# Patient Record
Sex: FEMALE | Race: WHITE | NOT HISPANIC OR LATINO | Employment: FULL TIME | URBAN - METROPOLITAN AREA
[De-identification: names, ages, dates, MRNs, and addresses within clinical notes are randomized per-mention and may not be internally consistent; named-entity substitution may affect disease eponyms.]

---

## 2019-03-29 ENCOUNTER — HOSPITAL ENCOUNTER (OUTPATIENT)
Dept: OTHER | Facility: HOSPITAL | Age: 24
Setting detail: SPECIMEN
Discharge: HOME | End: 2019-03-29
Attending: NURSE PRACTITIONER | Admitting: NURSE PRACTITIONER

## 2019-03-29 LAB
ALBUMIN SERPL-MCNC: 3.7 G/DL (ref 3.5–4.8)
ALBUMIN/GLOB SERPL: 1 {RATIO} (ref 1–1.7)
ALP SERPL-CCNC: 49 IU/L (ref 32–91)
ALT SERPL-CCNC: 18 IU/L (ref 14–54)
ANION GAP SERPL CALC-SCNC: 15 MMOL/L (ref 10–20)
AST SERPL-CCNC: 20 IU/L (ref 15–41)
BASOPHILS # BLD AUTO: 0 10*3/UL (ref 0–0.2)
BASOPHILS NFR BLD AUTO: 1 % (ref 0–2)
BILIRUB SERPL-MCNC: 0.5 MG/DL (ref 0.3–1.2)
BUN SERPL-MCNC: 9 MG/DL (ref 8–20)
BUN/CREAT SERPL: 12.9 (ref 5.4–26.2)
CALCIUM SERPL-MCNC: 9.2 MG/DL (ref 8.9–10.3)
CHLORIDE SERPL-SCNC: 103 MMOL/L (ref 101–111)
CHOLEST SERPL-MCNC: 188 MG/DL
CHOLEST/HDLC SERPL: 3.7 {RATIO}
CONV CO2: 23 MMOL/L (ref 22–32)
CONV LDL CHOLESTEROL DIRECT: 125 MG/DL (ref 0–100)
CONV TOTAL PROTEIN: 7.3 G/DL (ref 6.1–7.9)
CREAT UR-MCNC: 0.7 MG/DL (ref 0.4–1)
DIFFERENTIAL METHOD BLD: (no result)
EOSINOPHIL # BLD AUTO: 0.6 10*3/UL (ref 0–0.3)
EOSINOPHIL # BLD AUTO: 9 % (ref 0–3)
ERYTHROCYTE [DISTWIDTH] IN BLOOD BY AUTOMATED COUNT: 12.6 % (ref 11.5–14.5)
GLOBULIN UR ELPH-MCNC: 3.6 G/DL (ref 2.5–3.8)
GLUCOSE SERPL-MCNC: 78 MG/DL (ref 65–99)
HCT VFR BLD AUTO: 40.6 % (ref 35–49)
HDLC SERPL-MCNC: 51 MG/DL
HGB BLD-MCNC: 14 G/DL (ref 12–15)
LDLC/HDLC SERPL: 2.4 {RATIO}
LIPID INTERPRETATION: ABNORMAL
LYMPHOCYTES # BLD AUTO: 1.8 10*3/UL (ref 0.8–4.8)
LYMPHOCYTES NFR BLD AUTO: 25 % (ref 18–42)
MCH RBC QN AUTO: 31 PG (ref 26–32)
MCHC RBC AUTO-ENTMCNC: 34.5 G/DL (ref 32–36)
MCV RBC AUTO: 89.9 FL (ref 80–94)
MONOCYTES # BLD AUTO: 0.6 10*3/UL (ref 0.1–1.3)
MONOCYTES NFR BLD AUTO: 9 % (ref 2–11)
NEUTROPHILS # BLD AUTO: 4 10*3/UL (ref 2.3–8.6)
NEUTROPHILS NFR BLD AUTO: 56 % (ref 50–75)
NRBC BLD AUTO-RTO: 0 /100{WBCS}
NRBC/RBC NFR BLD MANUAL: 0 10*3/UL
PLATELET # BLD AUTO: 285 10*3/UL (ref 150–450)
PMV BLD AUTO: 8.9 FL (ref 7.4–10.4)
POTASSIUM SERPL-SCNC: 4 MMOL/L (ref 3.6–5.1)
RBC # BLD AUTO: 4.52 10*6/UL (ref 4–5.4)
SODIUM SERPL-SCNC: 137 MMOL/L (ref 136–144)
TRIGL SERPL-MCNC: 122 MG/DL
VLDLC SERPL CALC-MCNC: 11.9 MG/DL
WBC # BLD AUTO: 7.1 10*3/UL (ref 4.5–11.5)

## 2020-08-26 ENCOUNTER — OFFICE VISIT (OUTPATIENT)
Dept: FAMILY MEDICINE CLINIC | Facility: CLINIC | Age: 25
End: 2020-08-26

## 2020-08-26 ENCOUNTER — LAB (OUTPATIENT)
Dept: FAMILY MEDICINE CLINIC | Facility: CLINIC | Age: 25
End: 2020-08-26

## 2020-08-26 VITALS
DIASTOLIC BLOOD PRESSURE: 69 MMHG | OXYGEN SATURATION: 98 % | HEART RATE: 83 BPM | WEIGHT: 129.8 LBS | TEMPERATURE: 97.8 F | BODY MASS INDEX: 21.63 KG/M2 | SYSTOLIC BLOOD PRESSURE: 105 MMHG | HEIGHT: 65 IN

## 2020-08-26 DIAGNOSIS — Z11.59 NEED FOR HEPATITIS C SCREENING TEST: ICD-10-CM

## 2020-08-26 DIAGNOSIS — Z91.09 ENVIRONMENTAL ALLERGIES: ICD-10-CM

## 2020-08-26 DIAGNOSIS — E78.2 MIXED HYPERLIPIDEMIA: Primary | ICD-10-CM

## 2020-08-26 DIAGNOSIS — E78.2 MIXED HYPERLIPIDEMIA: ICD-10-CM

## 2020-08-26 DIAGNOSIS — Z23 NEED FOR VACCINATION: ICD-10-CM

## 2020-08-26 LAB
ALBUMIN SERPL-MCNC: 4.6 G/DL (ref 3.5–5.2)
ALBUMIN/GLOB SERPL: 1.7 G/DL
ALP SERPL-CCNC: 48 U/L (ref 39–117)
ALT SERPL W P-5'-P-CCNC: 35 U/L (ref 1–33)
ANION GAP SERPL CALCULATED.3IONS-SCNC: 6.7 MMOL/L (ref 5–15)
AST SERPL-CCNC: 28 U/L (ref 1–32)
BILIRUB SERPL-MCNC: 1.1 MG/DL (ref 0–1.2)
BUN SERPL-MCNC: 8 MG/DL (ref 6–20)
BUN/CREAT SERPL: 10.7 (ref 7–25)
CALCIUM SPEC-SCNC: 9.6 MG/DL (ref 8.6–10.5)
CHLORIDE SERPL-SCNC: 106 MMOL/L (ref 98–107)
CHOLEST SERPL-MCNC: 164 MG/DL (ref 0–200)
CO2 SERPL-SCNC: 26.3 MMOL/L (ref 22–29)
CREAT SERPL-MCNC: 0.75 MG/DL (ref 0.57–1)
GFR SERPL CREATININE-BSD FRML MDRD: 93 ML/MIN/1.73
GLOBULIN UR ELPH-MCNC: 2.7 GM/DL
GLUCOSE SERPL-MCNC: 83 MG/DL (ref 65–99)
HCV AB SER DONR QL: NORMAL
HDLC SERPL-MCNC: 43 MG/DL (ref 40–60)
LDLC SERPL CALC-MCNC: 111 MG/DL (ref 0–100)
LDLC/HDLC SERPL: 2.58 {RATIO}
POTASSIUM SERPL-SCNC: 4.1 MMOL/L (ref 3.5–5.2)
PROT SERPL-MCNC: 7.3 G/DL (ref 6–8.5)
SODIUM SERPL-SCNC: 139 MMOL/L (ref 136–145)
TRIGL SERPL-MCNC: 50 MG/DL (ref 0–150)
VLDLC SERPL-MCNC: 10 MG/DL (ref 5–40)

## 2020-08-26 PROCEDURE — 86803 HEPATITIS C AB TEST: CPT | Performed by: FAMILY MEDICINE

## 2020-08-26 PROCEDURE — 90715 TDAP VACCINE 7 YRS/> IM: CPT | Performed by: FAMILY MEDICINE

## 2020-08-26 PROCEDURE — 80053 COMPREHEN METABOLIC PANEL: CPT | Performed by: FAMILY MEDICINE

## 2020-08-26 PROCEDURE — 36415 COLL VENOUS BLD VENIPUNCTURE: CPT | Performed by: FAMILY MEDICINE

## 2020-08-26 PROCEDURE — 90471 IMMUNIZATION ADMIN: CPT | Performed by: FAMILY MEDICINE

## 2020-08-26 PROCEDURE — 80061 LIPID PANEL: CPT | Performed by: FAMILY MEDICINE

## 2020-08-26 PROCEDURE — 99213 OFFICE O/P EST LOW 20 MIN: CPT | Performed by: FAMILY MEDICINE

## 2020-08-26 RX ORDER — ELECTROLYTES/DEXTROSE
SOLUTION, ORAL ORAL
COMMUNITY
Start: 2018-03-26 | End: 2021-08-27

## 2020-08-26 RX ORDER — CETIRIZINE HYDROCHLORIDE 10 MG/1
10 TABLET ORAL DAILY
COMMUNITY
End: 2021-08-27

## 2020-08-26 RX ORDER — IBUPROFEN 200 MG
200 TABLET ORAL AS NEEDED
COMMUNITY
End: 2021-08-27

## 2020-08-26 NOTE — PROGRESS NOTES
Subjective   Salma Terrell is a 25 y.o. female.     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  History reviewed. No pertinent past medical history.  Past Surgical History:   Procedure Laterality Date   • APPENDECTOMY      2007   • GUM SURGERY     • WISDOM TOOTH EXTRACTION       Family History   Problem Relation Age of Onset   • Hyperlipidemia Father    • Hypertension Father    • Breast cancer Paternal Grandmother    • Hyperlipidemia Paternal Grandmother    • Hypertension Paternal Grandmother      Social History     Socioeconomic History   • Marital status:      Spouse name: Not on file   • Number of children: Not on file   • Years of education: Not on file   • Highest education level: Not on file   Tobacco Use   • Smoking status: Never Smoker   • Smokeless tobacco: Never Used   Substance and Sexual Activity   • Alcohol use: Not Currently   • Drug use: Never   • Sexual activity: Yes         Current Outpatient Medications:   •  cetirizine (zyrTEC) 10 MG tablet, Take 10 mg by mouth Daily., Disp: , Rfl:   •  ibuprofen (ADVIL,MOTRIN) 200 MG tablet, Take 200 mg by mouth As Needed for Mild Pain ., Disp: , Rfl:   •  Multiple Vitamins-Minerals (MULTIVITAMIN ADULT) tablet, MULTIVITAMIN ADULT TABS, Disp: , Rfl:     Review of Systems   Constitutional: Negative for diaphoresis, fatigue, fever, unexpected weight gain and unexpected weight loss.   HENT: Positive for rhinorrhea and sneezing.    Respiratory: Negative for cough, chest tightness and shortness of breath.    Cardiovascular: Negative for chest pain, palpitations and  "leg swelling.   Allergic/Immunologic: Positive for environmental allergies.   Neurological: Negative for dizziness, syncope and headache.     /69 (BP Location: Left arm, Patient Position: Sitting, Cuff Size: Adult)   Pulse 83   Temp 97.8 °F (36.6 °C) (Temporal)   Ht 165.7 cm (65.25\")   Wt 58.9 kg (129 lb 12.8 oz)   SpO2 98%   Breastfeeding No   BMI 21.43 kg/m²       Objective   Physical Exam   Constitutional: She appears well-developed and well-nourished. No distress.   HENT:   Head: Normocephalic and atraumatic.   Neck: Neck supple.   Cardiovascular: Normal rate, regular rhythm, normal heart sounds and intact distal pulses. Exam reveals no gallop and no friction rub.   No murmur heard.  Pulmonary/Chest: Effort normal and breath sounds normal. No respiratory distress. She has no wheezes. She has no rales.   Musculoskeletal: She exhibits no edema.   Lymphadenopathy:     She has no cervical adenopathy.   Neurological: She is alert.   Skin: Skin is warm and dry.   Psychiatric: She has a normal mood and affect.   Nursing note and vitals reviewed.        Assessment/Plan   Problems Addressed this Visit        Cardiovascular and Mediastinum    Mixed hyperlipidemia - Primary    Relevant Orders    Comprehensive Metabolic Panel    Lipid Panel       Other    Environmental allergies      Other Visit Diagnoses     Need for hepatitis C screening test        Relevant Orders    Hepatitis C Antibody    Need for vaccination        Relevant Orders    Tdap Vaccine Greater Than or Equal To 8yo IM (Completed)          Labs ordered  Encouraged her to get her flu shot this Fall  She will continue current meds  tdap updated       "

## 2020-09-03 ENCOUNTER — E-VISIT (OUTPATIENT)
Dept: FAMILY MEDICINE CLINIC | Facility: TELEHEALTH | Age: 25
End: 2020-09-03

## 2020-09-03 DIAGNOSIS — J06.9 UPPER RESPIRATORY TRACT INFECTION, UNSPECIFIED TYPE: Primary | ICD-10-CM

## 2020-09-03 PROCEDURE — U0003 INFECTIOUS AGENT DETECTION BY NUCLEIC ACID (DNA OR RNA); SEVERE ACUTE RESPIRATORY SYNDROME CORONAVIRUS 2 (SARS-COV-2) (CORONAVIRUS DISEASE [COVID-19]), AMPLIFIED PROBE TECHNIQUE, MAKING USE OF HIGH THROUGHPUT TECHNOLOGIES AS DESCRIBED BY CMS-2020-01-R: HCPCS | Performed by: NURSE PRACTITIONER

## 2020-09-03 PROCEDURE — 99422 OL DIG E/M SVC 11-20 MIN: CPT | Performed by: NURSE PRACTITIONER

## 2020-09-03 RX ORDER — BROMPHENIRAMINE MALEATE, PSEUDOEPHEDRINE HYDROCHLORIDE, AND DEXTROMETHORPHAN HYDROBROMIDE 2; 30; 10 MG/5ML; MG/5ML; MG/5ML
10 SYRUP ORAL 4 TIMES DAILY PRN
Qty: 280 ML | Refills: 0 | Status: SHIPPED | OUTPATIENT
Start: 2020-09-03 | End: 2020-11-17

## 2020-09-03 RX ORDER — BENZONATATE 200 MG/1
200 CAPSULE ORAL 3 TIMES DAILY PRN
Qty: 28 CAPSULE | Refills: 0 | Status: SHIPPED | OUTPATIENT
Start: 2020-09-03 | End: 2020-11-17

## 2020-09-03 NOTE — PROGRESS NOTES
"Salma Terrell    1995  [id number]    I have reviewed the e-Visit questionnaire and patient's answers, my assessment and plan are as follows:    HPI- Pt reports cough, fever/chills, nasal congestion, sore throat, HA, sweating HS, loss of appetite x 2 days. Cough is infrequent with clear phlegm. She reports being able to taste bacteria in the back of her throat and occasionally coughs up \"gunk\". Her tonsils are a little swollen. Denies red throat, white spots on tonsils. 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     Review of Systems - General ROS: positive for  - chills, fever and night sweats  ENT ROS: positive for - headaches, nasal congestion and sore throat  Respiratory ROS: positive for - cough and sputum changes      Diagnoses and all orders for this visit:    Upper respiratory tract infection, unspecified type  -     brompheniramine-pseudoephedrine-DM 30-2-10 MG/5ML syrup; Take 10 mL by mouth 4 (Four) Times a Day As Needed for Congestion or Cough.  -     benzonatate (TESSALON) 200 MG capsule; Take 1 capsule by mouth 3 (Three) Times a Day As Needed for Cough.  -     COVID-19,[facility] ROUTINE, NP/OP SWAB IN TRANSPORT MEDIA OR ESWAB 72 HR TAT - Swab, Nasopharynx; Future  -     QUESTIONNAIRE SERIES    Go to nearest [facility] for COVID-19 test. Call before you arrive and let them know you have an order. We will call you with the results.   Take medicine as prescribed, continue to treat your symptoms as you would with any cold or viral illness.   SELF QUARANTINE until you meet the following criteria:   -It has been at least 10 days from the onset of your symptoms,   -A minimum of 24 hours fever free without fever reducing medicine   -AND improved symptoms of cough, shortness of air, difficulty breathing, sore throat, muscle aches or body aches, chills, runny nose or congestion, loss of sense of taste or smell, nausea or vomiting, headache, diarrhea.     **Wear a cloth or surgical mask for " 14 days or until symptoms have resolved**    If symptoms worsen or do not improve follow up with your PCP or visit your nearest Urgent Care Center or ER.    Any medications prescribed have been sent electronically to   [facility]/pharmacy #[id number] - [place], [place] - [address] - [phone]  - [phone]   [address]  [place] [address]  Phone: [phone] Fax: [phone]    I spent 15 minutes reviewing this chart.     Mary Bobo, APRN  09/03/20  10:25 AM

## 2020-09-03 NOTE — PATIENT INSTRUCTIONS
Go to nearest [facility] for COVID-19 test. Call before you arrive and let them know you have an order. We will call you with the results.   Take medicine as prescribed, continue to treat your symptoms as you would with any cold or viral illness.   SELF QUARANTINE until you meet the following criteria:   -It has been at least 10 days from the onset of your symptoms,   -A minimum of 24 hours fever free without fever reducing medicine   -AND improved symptoms of cough, shortness of air, difficulty breathing, sore throat, muscle aches or body aches, chills, runny nose or congestion, loss of sense of taste or smell, nausea or vomiting, headache, diarrhea.     **Wear a cloth or surgical mask for 14 days or until symptoms have resolved**    If symptoms worsen or do not improve follow up with your PCP or visit your nearest Urgent Care Center or ER.      How to Quarantine at Home  Information for Patients and Families    These instructions are for people with confirmed or suspected COVID-19 who do not need to be hospitalized and those with confirmed COVID-19 who were hospitalized and discharged to care for themselves at home.    If you were tested through the Health Department  The Health Department will monitor your wellbeing.  If it is determined that you do not need to be hospitalized and can be isolated at home, you will be monitored by staff from your local or state health department.     If you were tested through a Commercial [facility]  You will need to monitor yourself and report changes in your symptoms to your doctor.  See the section below called Monitor Your Symptoms.    Follow these steps until a healthcare provider or local or state health department says you can return to your normal activities.    Stay home except to get medical care  • Restrict activities outside your home, except for getting medical care.   • Do not go to work, school, or public areas.   • Avoid using public transportation,  ride-sharing, or taxis.    Separate yourself from other people and animals in your home  People  As much as possible, you should stay in a specific room and away from other people in your home. Also, you should use a separate bathroom, if available.    Animals  You should restrict contact with pets and other animals while you are sick with COVID-19, just like you would around other people. When possible, have another member of your household care for your animals while you are sick. If you are sick with COVID-19, avoid contact with your pet, including petting, snuggling, being kissed or licked, and sharing food. If you must care for your pet or be around animals while you are sick, wash your hands before and after you interact with pets and wear a facemask. See COVID-19 and Animals for more information.    Call ahead before visiting your doctor  If you have a medical appointment, call the healthcare provider and tell them that you have or may have COVID-19. This information will help the healthcare provider’s office take steps to keep other people from getting infected or exposed.    Wear a facemask  You should wear a facemask when you are around other people (e.g., sharing a room or vehicle) or pets and before you enter a healthcare provider’s office.     If you are not able to wear a facemask (for example, because it causes trouble breathing), then people who live with you should not stay in the same room with you, or they should wear a facemask if they enter your room.    Cover your coughs and sneezes  • Cover your mouth and nose with a tissue when you cough or sneeze.   • Throw used tissues in a lined trash can.   • Immediately wash your hands with soap and water for at least 20 seconds or, if soap and water are not available, clean your hands with an alcohol-based hand  that contains at least 60% alcohol.    Clean your hands often  • Wash your hands often with soap and water for at least 20 seconds,  especially after blowing your nose, coughing, or sneezing; going to the bathroom; and before eating or preparing food.     • If soap and water are not readily available, use an alcohol-based hand  with at least 60% alcohol, covering all surfaces of your hands and rubbing them together until they feel dry.    • Soap and water are the best option if hands are visibly dirty. Avoid touching your eyes, nose, and mouth with unwashed hands.    Avoid sharing personal household items  • You should not share dishes, drinking glasses, cups, eating utensils, towels, or bedding with other people or pets in your home.   • After using these items, they should be washed thoroughly with soap and water.    Clean all “high-touch” surfaces everyday  • High touch surfaces include counters, tabletops, doorknobs, bathroom fixtures, toilets, phones, keyboards, tablets, and bedside tables.   • Also, clean any surfaces that may have blood, stool, or body fluids on them.   • Use a household cleaning spray or wipe, according to the label instructions. Labels contain instructions for safe and effective use of the cleaning product, including precautions you should take when applying the product, such as wearing gloves and making sure you have good ventilation during use of the product.    Monitor your symptoms  • Seek prompt medical attention if your illness is worsening (e.g., difficulty breathing).   • Before seeking care, call your healthcare provider and tell them that you have, or are being evaluated for, COVID-19.   • Put on a facemask before you enter the facility.     • These steps will help the healthcare provider’s office to keep other people in the office or waiting room from getting infected or exposed.   • Persons who are placed under active monitoring or facilitated self-monitoring should follow instructions provided by their local health department or occupational health professionals, as appropriate.  • If you have a  medical emergency and need to call 911, notify the dispatch personnel that you have, or are being evaluated for COVID-19. If possible, put on a facemask before emergency medical services arrive.    Discontinuing home isolation  Patients with confirmed COVID-19 should remain under home isolation precautions until the risk of secondary transmission to others is thought to be low. The decision to discontinue home isolation precautions should be made on a case-by-case basis, in consultation with healthcare providers and state and local health departments.    The below content are for household members, intimate partners, and caregivers of a patient with symptomatic laboratory-confirmed COVID-19 or a patient under investigation:    Household members, intimate partners, and caregivers may have close contact with a person with symptomatic, laboratory-confirmed COVID-19 or a person under investigation.     Close contacts should monitor their health; they should call their healthcare provider right away if they develop symptoms suggestive of COVID-19 (e.g., fever, cough, shortness of breath)     Close contacts should also follow these recommendations:  • Make sure that you understand and can help the patient follow their healthcare provider’s instructions for medication(s) and care. You should help the patient with basic needs in the home and provide support for getting groceries, prescriptions, and other personal needs.  • Monitor the patient’s symptoms. If the patient is getting sicker, call his or her healthcare provider and tell them that the patient has laboratory-confirmed COVID-19. This will help the healthcare provider’s office take steps to keep other people in the office or waiting room from getting infected. Ask the healthcare provider to call the local or state health department for additional guidance. If the patient has a medical emergency and you need to call 911, notify the dispatch personnel that the patient  has, or is being evaluated for COVID-19.  • Household members should stay in another room or be  from the patient as much as possible. Household members should use a separate bedroom and bathroom, if available.  • Prohibit visitors who do not have an essential need to be in the home.  • Household members should care for any pets in the home. Do not handle pets or other animals while sick.  For more information, see COVID-19 and Animals.  • Make sure that shared spaces in the home have good air flow, such as by an air conditioner or an opened window, weather permitting.  • Perform hand hygiene frequently. Wash your hands often with soap and water for at least 20 seconds or use an alcohol-based hand  that contains 60 to 95% alcohol, covering all surfaces of your hands and rubbing them together until they feel dry. Soap and water should be used preferentially if hands are visibly dirty.  • Avoid touching your eyes, nose, and mouth with unwashed hands.  • The patient should wear a facemask when you are around other people. If the patient is not able to wear a facemask (for example, because it causes trouble breathing), you, as the caregiver, should wear a mask when you are in the same room as the patient.  • Wear a disposable facemask and gloves when you touch or have contact with the patient’s blood, stool, or body fluids, such as saliva, sputum, nasal mucus, vomit, or urine.   o Throw out disposable facemasks and gloves after using them. Do not reuse.  o When removing personal protective equipment, first remove and dispose of gloves. Then, immediately clean your hands with soap and water or alcohol-based hand . Next, remove and dispose of facemask, and immediately clean your hands again with soap and water or alcohol-based hand .  • Avoid sharing household items with the patient. You should not share dishes, drinking glasses, cups, eating utensils, towels, bedding, or other items.  After the patient uses these items, you should wash them thoroughly (see below “Wash laundry thoroughly”).  • Clean all “high-touch” surfaces, such as counters, tabletops, doorknobs, bathroom fixtures, toilets, phones, keyboards, tablets, and bedside tables, every day. Also, clean any surfaces that may have blood, stool, or body fluids on them.   o Use a household cleaning spray or wipe, according to the label instructions. Labels contain instructions for safe and effective use of the cleaning product including precautions you should take when applying the product, such as wearing gloves and making sure you have good ventilation during use of the product.  • Wash laundry thoroughly.   o Immediately remove and wash clothes or bedding that have blood, stool, or body fluids on them.  o Wear disposable gloves while handling soiled items and keep soiled items away from your body. Clean your hands (with soap and water or an alcohol-based hand ) immediately after removing your gloves.  o Read and follow directions on labels of laundry or clothing items and detergent. In general, using a normal laundry detergent according to washing machine instructions and dry thoroughly using the warmest temperatures recommended on the clothing label.  • Place all used disposable gloves, facemasks, and other contaminated items in a lined container before disposing of them with other household waste. Clean your hands (with soap and water or an alcohol-based hand ) immediately after handling these items. Soap and water should be used preferentially if hands are visibly dirty.  • Discuss any additional questions with your state or local health department or healthcare provider.    Adapted from information provided by the Centers for Disease Control and Prevention.  For more information, visit https://www.cdc.gov/coronavirus/2019-ncov/hcp/guidance-prevent-spread.htmlCOVID-19  COVID-19 is a respiratory infection that is  caused by a virus called severe acute respiratory syndrome coronavirus 2 (SARS-CoV-2). The disease is also known as coronavirus disease or novel coronavirus. In some people, the virus may not cause any symptoms. In others, it may cause a serious infection. The infection can get worse quickly and can lead to complications, such as:  · Pneumonia, or infection of the lungs.  · Acute respiratory distress syndrome or ARDS. This is fluid build-up in the lungs.  · Acute respiratory failure. This is a condition in which there is not enough oxygen passing from the lungs to the body.  · Sepsis or septic shock. This is a serious bodily reaction to an infection.  · Blood clotting problems.  · Secondary infections due to bacteria or fungus.  The virus that causes COVID-19 is contagious. This means that it can spread from person to person through droplets from coughs and sneezes (respiratory secretions).  What are the causes?  This illness is caused by a virus. You may catch the virus by:  · Breathing in droplets from an infected person's cough or sneeze.  · Touching something, like a table or a doorknob, that was exposed to the virus (contaminated) and then touching your mouth, nose, or eyes.  What increases the risk?  Risk for infection  You are more likely to be infected with this virus if you:  · Live in or travel to an area with a COVID-19 outbreak.  · Come in contact with a sick person who recently traveled to an area with a COVID-19 outbreak.  · Provide care for or live with a person who is infected with COVID-19.  Risk for serious illness  You are more likely to become seriously ill from the virus if you:  · Are 65 years of age or older.  · Have a long-term disease that lowers your body's ability to fight infection (immunocompromised).  · Live in a nursing home or long-term care facility.  · Have a long-term (chronic) disease such as:  ? Chronic lung disease, including chronic obstructive pulmonary disease or  asthma  ? Heart disease.  ? Diabetes.  ? Chronic kidney disease.  ? Liver disease.  · Are obese.  What are the signs or symptoms?  Symptoms of this condition can range from mild to severe. Symptoms may appear any time from 2 to 14 days after being exposed to the virus. They include:  · A fever.  · A cough.  · Difficulty breathing.  · Chills.  · Muscle pains.  · A sore throat.  · Loss of taste or smell.  Some people may also have stomach problems, such as nausea, vomiting, or diarrhea.  Other people may not have any symptoms of COVID-19.  How is this diagnosed?  This condition may be diagnosed based on:  · Your signs and symptoms, especially if:  ? You live in an area with a COVID-19 outbreak.  ? You recently traveled to or from an area where the virus is common.  ? You provide care for or live with a person who was diagnosed with COVID-19.  · A physical exam.  · Lab tests, which may include:  ? A nasal swab to take a sample of fluid from your nose.  ? A throat swab to take a sample of fluid from your throat.  ? A sample of mucus from your lungs (sputum).  ? Blood tests.  · Imaging tests, which may include, X-rays, CT scan, or ultrasound.  How is this treated?  At present, there is no medicine to treat COVID-19. Medicines that treat other diseases are being used on a trial basis to see if they are effective against COVID-19.  Your health care provider will talk with you about ways to treat your symptoms. For most people, the infection is mild and can be managed at home with rest, fluids, and over-the-counter medicines.  Treatment for a serious infection usually takes places in a hospital intensive care unit (ICU). It may include one or more of the following treatments. These treatments are given until your symptoms improve.  · Receiving fluids and medicines through an IV.  · Supplemental oxygen. Extra oxygen is given through a tube in the nose, a face mask, or a gomez.  · Positioning you to lie on your stomach (prone  position). This makes it easier for oxygen to get into the lungs.  · Continuous positive airway pressure (CPAP) or bi-level positive airway pressure (BPAP) machine. This treatment uses mild air pressure to keep the airways open. A tube that is connected to a motor delivers oxygen to the body.  · Ventilator. This treatment moves air into and out of the lungs by using a tube that is placed in your windpipe.  · Tracheostomy. This is a procedure to create a hole in the neck so that a breathing tube can be inserted.  · Extracorporeal membrane oxygenation (ECMO). This procedure gives the lungs a chance to recover by taking over the functions of the heart and lungs. It supplies oxygen to the body and removes carbon dioxide.  Follow these instructions at home:  Lifestyle  · If you are sick, stay home except to get medical care. Your health care provider will tell you how long to stay home. Call your health care provider before you go for medical care.  · Rest at home as told by your health care provider.  · Do not use any products that contain nicotine or tobacco, such as cigarettes, e-cigarettes, and chewing tobacco. If you need help quitting, ask your health care provider.  · Return to your normal activities as told by your health care provider. Ask your health care provider what activities are safe for you.  General instructions  · Take over-the-counter and prescription medicines only as told by your health care provider.  · Drink enough fluid to keep your urine pale yellow.  · Keep all follow-up visits as told by your health care provider. This is important.  How is this prevented?    There is no vaccine to help prevent COVID-19 infection. However, there are steps you can take to protect yourself and others from this virus.  To protect yourself:   · Do not travel to areas where COVID-19 is a risk. The areas where COVID-19 is reported change often. To identify high-risk areas and travel restrictions, check the CDC travel  website: wwwnc.cdc.gov/travel/notices  · If you live in, or must travel to, an area where COVID-19 is a risk, take precautions to avoid infection.  ? Stay away from people who are sick.  ? Wash your hands often with soap and water for 20 seconds. If soap and water are not available, use an alcohol-based hand .  ? Avoid touching your mouth, face, eyes, or nose.  ? Avoid going out in public, follow guidance from your state and local health authorities.  ? If you must go out in public, wear a cloth face covering or face mask.  ? Disinfect objects and surfaces that are frequently touched every day. This may include:  § Counters and tables.  § Doorknobs and light switches.  § Sinks and faucets.  § Electronics, such as phones, remote controls, keyboards, computers, and tablets.  To protect others:  If you have symptoms of COVID-19, take steps to prevent the virus from spreading to others.  · If you think you have a COVID-19 infection, contact your health care provider right away. Tell your health care team that you think you may have a COVID-19 infection.  · Stay home. Leave your house only to seek medical care. Do not use public transport.  · Do not travel while you are sick.  · Wash your hands often with soap and water for 20 seconds. If soap and water are not available, use alcohol-based hand .  · Stay away from other members of your household. Let healthy household members care for children and pets, if possible. If you have to care for children or pets, wash your hands often and wear a mask. If possible, stay in your own room, separate from others. Use a different bathroom.  · Make sure that all people in your household wash their hands well and often.  · Cough or sneeze into a tissue or your sleeve or elbow. Do not cough or sneeze into your hand or into the air.  · Wear a cloth face covering or face mask.  Where to find more information  · Centers for Disease Control and Prevention:  www.cdc.gov/coronavirus/2019-ncov/index.html  · World Health Organization: www.who.int/health-topics/coronavirus  Contact a health care provider if:  · You live in or have traveled to an area where COVID-19 is a risk and you have symptoms of the infection.  · You have had contact with someone who has COVID-19 and you have symptoms of the infection.  Get help right away if:  · You have trouble breathing.  · You have pain or pressure in your chest.  · You have confusion.  · You have bluish lips and fingernails.  · You have difficulty waking from sleep.  · You have symptoms that get worse.  These symptoms may represent a serious problem that is an emergency. Do not wait to see if the symptoms will go away. Get medical help right away. Call your local emergency services (911 in the [place] Do not drive yourself to the hospital. Let the emergency medical personnel know if you think you have COVID-19.  Summary  · COVID-19 is a respiratory infection that is caused by a virus. It is also known as coronavirus disease or novel coronavirus. It can cause serious infections, such as pneumonia, acute respiratory distress syndrome, acute respiratory failure, or sepsis.  · The virus that causes COVID-19 is contagious. This means that it can spread from person to person through droplets from coughs and sneezes.  · You are more likely to develop a serious illness if you are 65 years of age or older, have a weak immunity, live in a nursing home, or have chronic disease.  · There is no medicine to treat COVID-19. Your health care provider will talk with you about ways to treat your symptoms.  · Take steps to protect yourself and others from infection. Wash your hands often and disinfect objects and surfaces that are frequently touched every day. Stay away from people who are sick and wear a mask if you are sick.  This information is not intended to replace advice given to you by your health care provider. Make sure you discuss any  "questions you have with your health care provider.  Document Released: 01/23/2020 Document Revised: 05/14/2020 Document Reviewed: 01/23/2020  [employer]vier Patient Education © 2020 [employer]  Upper Respiratory Infection, Adult  An upper respiratory infection (URI) affects the nose, throat, and upper air passages. URIs are caused by germs (viruses). The most common type of URI is often called \"the common cold.\"  Medicines cannot cure URIs, but you can do things at home to relieve your symptoms. URIs usually get better within 7-10 days.  Follow these instructions at home:  Activity  · Rest as needed.  · If you have a fever, stay home from work or school until your fever is gone, or until your doctor says you may return to work or school.  ? You should stay home until you cannot spread the infection anymore (you are not contagious).  ? Your doctor may have you wear a face mask so you have less risk of spreading the infection.  Relieving symptoms  · Gargle with a salt-water mixture 3-4 times a day or as needed. To make a salt-water mixture, completely dissolve ½-1 tsp of salt in 1 cup of warm water.  · Use a cool-mist humidifier to add moisture to the air. This can help you breathe more easily.  Eating and drinking    · Drink enough fluid to keep your pee (urine) pale yellow.  · Eat soups and other clear broths.  General instructions    · Take over-the-counter and prescription medicines only as told by your doctor. These include cold medicines, fever reducers, and cough suppressants.  · Do not use any products that contain nicotine or tobacco. These include cigarettes and e-cigarettes. If you need help quitting, ask your doctor.  · Avoid being where people are smoking (avoid secondhand smoke).  · Make sure you get regular shots and get the flu shot every year.  · Keep all follow-up visits as told by your doctor. This is important.  How to avoid spreading infection to others    · Wash your hands often with soap and water. " "If you do not have soap and water, use hand .  · Avoid touching your mouth, face, eyes, or nose.  · Cough or sneeze into a tissue or your sleeve or elbow. Do not cough or sneeze into your hand or into the air.  Contact a doctor if:  · You are getting worse, not better.  · You have any of these:  ? A fever.  ? Chills.  ? Brown or red mucus in your nose.  ? Yellow or brown fluid (discharge)coming from your nose.  ? Pain in your face, especially when you bend forward.  ? Swollen neck glands.  ? Pain with swallowing.  ? White areas in the back of your throat.  Get help right away if:  · You have shortness of breath that gets worse.  · You have very bad or constant:  ? Headache.  ? Ear pain.  ? Pain in your forehead, behind your eyes, and over your cheekbones (sinus pain).  ? Chest pain.  · You have long-lasting (chronic) lung disease along with any of these:  ? Wheezing.  ? Long-lasting cough.  ? Coughing up blood.  ? A change in your usual mucus.  · You have a stiff neck.  · You have changes in your:  ? Vision.  ? Hearing.  ? Thinking.  ? Mood.  Summary  · An upper respiratory infection (URI) is caused by a germ called a virus. The most common type of URI is often called \"the common cold.\"  · URIs usually get better within 7-10 days.  · Take over-the-counter and prescription medicines only as told by your doctor.  This information is not intended to replace advice given to you by your health care provider. Make sure you discuss any questions you have with your health care provider.  Document Released: 06/05/2009 Document Revised: 12/26/2019 Document Reviewed: 08/10/2018  Elsevier Patient Education © 2020 [employer]    "

## 2020-11-17 ENCOUNTER — E-VISIT (OUTPATIENT)
Dept: FAMILY MEDICINE CLINIC | Facility: TELEHEALTH | Age: 25
End: 2020-11-17

## 2020-11-17 DIAGNOSIS — J06.9 UPPER RESPIRATORY TRACT INFECTION, UNSPECIFIED TYPE: Primary | ICD-10-CM

## 2020-11-17 PROCEDURE — 99421 OL DIG E/M SVC 5-10 MIN: CPT | Performed by: NURSE PRACTITIONER

## 2020-11-17 RX ORDER — DROSPIRENONE AND ETHINYL ESTRADIOL 0.02-3(28)
1 KIT ORAL
COMMUNITY
Start: 2018-06-12 | End: 2021-08-27

## 2020-11-17 NOTE — PROGRESS NOTES
I reviewed the patient's e-visit. Dx: URI. Criteria given for antibiotic use. Patient follow up if symptoms change or worsen or do not improve by the ten day julieta after symptom onset.    I spent 5-10 minutes in the patient's chart.

## 2021-05-14 LAB
EXTERNAL ABO GROUPING: NORMAL
EXTERNAL HEPATITIS B SURFACE ANTIGEN: NEGATIVE
EXTERNAL RH FACTOR: POSITIVE
EXTERNAL RUBELLA QUALITATIVE: NORMAL
EXTERNAL SYPHILIS RPR SCREEN: NORMAL
HIV1 P24 AG SERPL QL IA: NEGATIVE

## 2021-08-27 ENCOUNTER — LAB (OUTPATIENT)
Dept: FAMILY MEDICINE CLINIC | Facility: CLINIC | Age: 26
End: 2021-08-27

## 2021-08-27 ENCOUNTER — OFFICE VISIT (OUTPATIENT)
Dept: FAMILY MEDICINE CLINIC | Facility: CLINIC | Age: 26
End: 2021-08-27

## 2021-08-27 VITALS
WEIGHT: 142 LBS | BODY MASS INDEX: 23.66 KG/M2 | TEMPERATURE: 98.6 F | OXYGEN SATURATION: 99 % | SYSTOLIC BLOOD PRESSURE: 94 MMHG | DIASTOLIC BLOOD PRESSURE: 62 MMHG | HEIGHT: 65 IN | HEART RATE: 79 BPM

## 2021-08-27 DIAGNOSIS — Z01.84 IMMUNITY STATUS TESTING: ICD-10-CM

## 2021-08-27 DIAGNOSIS — Z00.00 ENCOUNTER FOR GENERAL ADULT MEDICAL EXAMINATION W/O ABNORMAL FINDINGS: Primary | ICD-10-CM

## 2021-08-27 PROBLEM — L70.9 ACNE: Status: ACTIVE | Noted: 2021-08-27

## 2021-08-27 PROCEDURE — 86769 SARS-COV-2 COVID-19 ANTIBODY: CPT | Performed by: FAMILY MEDICINE

## 2021-08-27 PROCEDURE — 36415 COLL VENOUS BLD VENIPUNCTURE: CPT

## 2021-08-27 PROCEDURE — 99395 PREV VISIT EST AGE 18-39: CPT | Performed by: FAMILY MEDICINE

## 2021-08-27 RX ORDER — OMEGA-3S/DHA/EPA/FISH OIL 1000-1400
1 CAPSULE,DELAYED RELEASE (ENTERIC COATED) ORAL DAILY
COMMUNITY

## 2021-08-27 NOTE — PROGRESS NOTES
Subjective   Salma Terrell is a 26 y.o. female.     Here for CPE  She sees GYN  She is currently pregnant-23 weeks       The following portions of the patient's history were reviewed and updated as appropriate: allergies, current medications, past family history, past medical history, past social history, past surgical history, and problem list.  History reviewed. No pertinent past medical history.  Past Surgical History:   Procedure Laterality Date   • APPENDECTOMY      2007   • EYE SURGERY  2018    Lasik   • GUM SURGERY     • WISDOM TOOTH EXTRACTION       Family History   Problem Relation Age of Onset   • Hyperlipidemia Father    • Hypertension Father    • Breast cancer Paternal Grandmother    • Hyperlipidemia Paternal Grandmother    • Hypertension Paternal Grandmother    • Cancer Paternal Grandmother         Breast, Ureter   • Alcohol abuse Maternal Grandmother    • Cancer Paternal Grandfather         Lung     Social History     Socioeconomic History   • Marital status:      Spouse name: Not on file   • Number of children: Not on file   • Years of education: Not on file   • Highest education level: Not on file   Tobacco Use   • Smoking status: Never Smoker   • Smokeless tobacco: Never Used   Vaping Use   • Vaping Use: Never used   Substance and Sexual Activity   • Alcohol use: Not Currently     Alcohol/week: 0.0 standard drinks   • Drug use: Never   • Sexual activity: Yes     Partners: Male     Birth control/protection: None     Comment: Currently pregnant- 2nd trimester         Current Outpatient Medications:   •  FIBER ADULT GUMMIES PO, Take  by mouth., Disp: , Rfl:   •  Prenatal MV-Min-Fe Fum-FA-DHA (PRENATAL 1 PO), Take  by mouth., Disp: , Rfl:     Review of Systems   Constitutional: Negative.    HENT: Negative.    Respiratory: Negative.    Cardiovascular: Negative.    Gastrointestinal: Negative.    Endocrine: Negative.    Genitourinary: Negative.    Musculoskeletal: Negative.    Skin: Negative.   "  Allergic/Immunologic: Negative.    Neurological: Negative.    Psychiatric/Behavioral: Negative.      BP 94/62 (BP Location: Left arm, Patient Position: Sitting, Cuff Size: Adult)   Pulse 79   Temp 98.6 °F (37 °C) (Temporal)   Ht 165.7 cm (65.25\")   Wt 64.4 kg (142 lb)   SpO2 99%   Breastfeeding No   BMI 23.45 kg/m²       Objective   Physical Exam  Vitals and nursing note reviewed.   Constitutional:       Appearance: Normal appearance. She is well-developed and well-groomed.   HENT:      Head: Normocephalic and atraumatic.      Right Ear: Tympanic membrane, ear canal and external ear normal.      Left Ear: Tympanic membrane, ear canal and external ear normal.      Nose: Nose normal.      Mouth/Throat:      Mouth: Mucous membranes are moist.      Pharynx: Oropharynx is clear.   Eyes:      Extraocular Movements: Extraocular movements intact.      Conjunctiva/sclera: Conjunctivae normal.      Pupils: Pupils are equal, round, and reactive to light.   Neck:      Thyroid: No thyromegaly.      Vascular: No carotid bruit.   Cardiovascular:      Rate and Rhythm: Normal rate and regular rhythm.      Pulses: Normal pulses.      Heart sounds: Normal heart sounds.   Pulmonary:      Effort: Pulmonary effort is normal.      Breath sounds: Normal breath sounds.   Abdominal:      General: Abdomen is flat. Bowel sounds are normal.      Palpations: Abdomen is soft. There is no hepatomegaly, splenomegaly or mass.      Tenderness: There is no abdominal tenderness.      Hernia: No hernia is present.      Comments: Gravid   Musculoskeletal:      Cervical back: Normal range of motion and neck supple.      Right lower leg: No edema.      Left lower leg: No edema.   Lymphadenopathy:      Cervical: No cervical adenopathy.      Upper Body:      Right upper body: No supraclavicular or axillary adenopathy.      Left upper body: No supraclavicular or axillary adenopathy.   Skin:     General: Skin is warm and dry.      Findings: No lesion " or rash.   Neurological:      General: No focal deficit present.      Mental Status: She is alert.      Motor: Motor function is intact.      Deep Tendon Reflexes: Reflexes are normal and symmetric.   Psychiatric:         Attention and Perception: Attention normal.         Mood and Affect: Mood normal.         Behavior: Behavior is cooperative.       Brief Urine Lab Results     None            Assessment/Plan   Problems Addressed this Visit        Health Encounters    Encounter for general adult medical examination w/o abnormal findings - Primary      Other Visit Diagnoses     Immunity status testing        Relevant Orders    SARS-CoV-2 Antibodies (Roche)      Diagnoses       Codes Comments    Encounter for general adult medical examination w/o abnormal findings    -  Primary ICD-10-CM: Z00.00  ICD-9-CM: V70.9     Immunity status testing     ICD-10-CM: Z01.84  ICD-9-CM: V72.61         Labs were  Done last year and she prefers to defer until next year  She is still debating getting the Covid vaccine  Cannot talk about the pros and cons both ways  She understands that by not getting it, she is putting herself and her unborn child at risk of death should she contract the delta variant  She definitely plans to get the vaccine after the delivery but is still considering getting it prior to that  She had Covid last September and would like to get antibodies to see if she still has some immunity  She was counseled on need to get a flu shot during her third trimester

## 2021-08-28 LAB — SARS-COV-2 AB SERPL QL IA: POSITIVE

## 2021-11-22 LAB — EXTERNAL GROUP B STREP ANTIGEN: NORMAL

## 2021-12-18 ENCOUNTER — PREP FOR SURGERY (OUTPATIENT)
Dept: OTHER | Facility: HOSPITAL | Age: 26
End: 2021-12-18

## 2021-12-18 RX ORDER — MORPHINE SULFATE 4 MG/ML
4 INJECTION, SOLUTION INTRAMUSCULAR; INTRAVENOUS
Status: CANCELLED | OUTPATIENT
Start: 2021-12-18 | End: 2021-12-28

## 2021-12-18 RX ORDER — ONDANSETRON 2 MG/ML
4 INJECTION INTRAMUSCULAR; INTRAVENOUS EVERY 6 HOURS PRN
Status: CANCELLED | OUTPATIENT
Start: 2021-12-18

## 2021-12-18 RX ORDER — SODIUM CHLORIDE, SODIUM LACTATE, POTASSIUM CHLORIDE, CALCIUM CHLORIDE 600; 310; 30; 20 MG/100ML; MG/100ML; MG/100ML; MG/100ML
125 INJECTION, SOLUTION INTRAVENOUS CONTINUOUS
Status: CANCELLED | OUTPATIENT
Start: 2021-12-18

## 2021-12-18 RX ORDER — OXYTOCIN-SODIUM CHLORIDE 0.9% IV SOLN 30 UNIT/500ML 30-0.9/5 UT/ML-%
2 SOLUTION INTRAVENOUS
Status: CANCELLED | OUTPATIENT
Start: 2021-12-18

## 2021-12-18 RX ORDER — CARBOPROST TROMETHAMINE 250 UG/ML
250 INJECTION, SOLUTION INTRAMUSCULAR AS NEEDED
Status: CANCELLED | OUTPATIENT
Start: 2021-12-18

## 2021-12-18 RX ORDER — OXYTOCIN-SODIUM CHLORIDE 0.9% IV SOLN 30 UNIT/500ML 30-0.9/5 UT/ML-%
999 SOLUTION INTRAVENOUS ONCE
Status: CANCELLED | OUTPATIENT
Start: 2021-12-18

## 2021-12-18 RX ORDER — SODIUM CHLORIDE 0.9 % (FLUSH) 0.9 %
3 SYRINGE (ML) INJECTION EVERY 12 HOURS SCHEDULED
Status: CANCELLED | OUTPATIENT
Start: 2021-12-18

## 2021-12-18 RX ORDER — SODIUM CHLORIDE 0.9 % (FLUSH) 0.9 %
3-10 SYRINGE (ML) INJECTION AS NEEDED
Status: CANCELLED | OUTPATIENT
Start: 2021-12-18

## 2021-12-18 RX ORDER — MISOPROSTOL 200 UG/1
800 TABLET ORAL AS NEEDED
Status: CANCELLED | OUTPATIENT
Start: 2021-12-18

## 2021-12-18 RX ORDER — LIDOCAINE HYDROCHLORIDE 10 MG/ML
5 INJECTION, SOLUTION EPIDURAL; INFILTRATION; INTRACAUDAL; PERINEURAL AS NEEDED
Status: CANCELLED | OUTPATIENT
Start: 2021-12-18

## 2021-12-18 RX ORDER — ONDANSETRON 4 MG/1
4 TABLET, FILM COATED ORAL EVERY 6 HOURS PRN
Status: CANCELLED | OUTPATIENT
Start: 2021-12-18

## 2021-12-18 RX ORDER — ACETAMINOPHEN 325 MG/1
650 TABLET ORAL EVERY 4 HOURS PRN
Status: CANCELLED | OUTPATIENT
Start: 2021-12-18

## 2021-12-18 RX ORDER — METHYLERGONOVINE MALEATE 0.2 MG/ML
200 INJECTION INTRAVENOUS ONCE AS NEEDED
Status: CANCELLED | OUTPATIENT
Start: 2021-12-18

## 2021-12-18 RX ORDER — OXYTOCIN-SODIUM CHLORIDE 0.9% IV SOLN 30 UNIT/500ML 30-0.9/5 UT/ML-%
250 SOLUTION INTRAVENOUS CONTINUOUS
Status: CANCELLED | OUTPATIENT
Start: 2021-12-18 | End: 2021-12-18

## 2021-12-18 RX ORDER — OXYTOCIN-SODIUM CHLORIDE 0.9% IV SOLN 30 UNIT/500ML 30-0.9/5 UT/ML-%
125 SOLUTION INTRAVENOUS CONTINUOUS PRN
Status: CANCELLED | OUTPATIENT
Start: 2021-12-18

## 2021-12-18 RX ORDER — IBUPROFEN 600 MG/1
600 TABLET ORAL EVERY 6 HOURS PRN
Status: CANCELLED | OUTPATIENT
Start: 2021-12-18

## 2021-12-19 ENCOUNTER — HOSPITAL ENCOUNTER (OUTPATIENT)
Dept: LABOR AND DELIVERY | Facility: HOSPITAL | Age: 26
Discharge: HOME | End: 2021-12-19

## 2021-12-19 ENCOUNTER — HOSPITAL ENCOUNTER (INPATIENT)
Facility: HOSPITAL | Age: 26
LOS: 3 days | Discharge: HOME | End: 2021-12-22
Attending: OBSTETRICS & GYNECOLOGY | Admitting: OBSTETRICS & GYNECOLOGY

## 2021-12-19 ENCOUNTER — ANESTHESIA (OUTPATIENT)
Dept: LABOR AND DELIVERY | Facility: HOSPITAL | Age: 26
End: 2021-12-19

## 2021-12-19 ENCOUNTER — ANESTHESIA EVENT (OUTPATIENT)
Dept: LABOR AND DELIVERY | Facility: HOSPITAL | Age: 26
End: 2021-12-19

## 2021-12-19 DIAGNOSIS — Z34.90 ENCOUNTER FOR INDUCTION OF LABOR: Primary | ICD-10-CM

## 2021-12-19 DIAGNOSIS — O34.211 MATERNAL CARE DUE TO LOW TRANSVERSE UTERINE SCAR FROM PREVIOUS CESAREAN DELIVERY: ICD-10-CM

## 2021-12-19 LAB
ABO GROUP BLD: NORMAL
AMPHET+METHAMPHET UR QL: NEGATIVE
BARBITURATES UR QL SCN: NEGATIVE
BASOPHILS # BLD AUTO: 0 10*3/MM3 (ref 0–0.2)
BASOPHILS NFR BLD AUTO: 0.5 % (ref 0–1.5)
BENZODIAZ UR QL SCN: NEGATIVE
BLD GP AB SCN SERPL QL: NEGATIVE
CANNABINOIDS SERPL QL: NEGATIVE
COCAINE UR QL: NEGATIVE
DEPRECATED RDW RBC AUTO: 42.4 FL (ref 37–54)
EOSINOPHIL # BLD AUTO: 0.2 10*3/MM3 (ref 0–0.4)
EOSINOPHIL NFR BLD AUTO: 2.8 % (ref 0.3–6.2)
ERYTHROCYTE [DISTWIDTH] IN BLOOD BY AUTOMATED COUNT: 13.4 % (ref 12.3–15.4)
HCT VFR BLD AUTO: 39.9 % (ref 34–46.6)
HGB BLD-MCNC: 13.6 G/DL (ref 12–15.9)
LYMPHOCYTES # BLD AUTO: 1.4 10*3/MM3 (ref 0.7–3.1)
LYMPHOCYTES NFR BLD AUTO: 18.5 % (ref 19.6–45.3)
MCH RBC QN AUTO: 31.5 PG (ref 26.6–33)
MCHC RBC AUTO-ENTMCNC: 34.2 G/DL (ref 31.5–35.7)
MCV RBC AUTO: 92.2 FL (ref 79–97)
METHADONE UR QL SCN: NEGATIVE
MONOCYTES # BLD AUTO: 0.6 10*3/MM3 (ref 0.1–0.9)
MONOCYTES NFR BLD AUTO: 7.2 % (ref 5–12)
NEUTROPHILS NFR BLD AUTO: 5.4 10*3/MM3 (ref 1.7–7)
NEUTROPHILS NFR BLD AUTO: 71 % (ref 42.7–76)
NRBC BLD AUTO-RTO: 0 /100 WBC (ref 0–0.2)
OPIATES UR QL: NEGATIVE
OXYCODONE UR QL SCN: NEGATIVE
PLATELET # BLD AUTO: 163 10*3/MM3 (ref 140–450)
PMV BLD AUTO: 9.8 FL (ref 6–12)
RBC # BLD AUTO: 4.33 10*6/MM3 (ref 3.77–5.28)
RH BLD: POSITIVE
SARS-COV-2 RNA PNL SPEC NAA+PROBE: NOT DETECTED
T&S EXPIRATION DATE: NORMAL
WBC NRBC COR # BLD: 7.6 10*3/MM3 (ref 3.4–10.8)

## 2021-12-19 PROCEDURE — 86900 BLOOD TYPING SEROLOGIC ABO: CPT

## 2021-12-19 PROCEDURE — 80307 DRUG TEST PRSMV CHEM ANLYZR: CPT | Performed by: OBSTETRICS & GYNECOLOGY

## 2021-12-19 PROCEDURE — C1755 CATHETER, INTRASPINAL: HCPCS | Performed by: ANESTHESIOLOGY

## 2021-12-19 PROCEDURE — 0 PENICILLIN G POTASSIUM PER 600000 UNITS: Performed by: OBSTETRICS & GYNECOLOGY

## 2021-12-19 PROCEDURE — 86850 RBC ANTIBODY SCREEN: CPT | Performed by: OBSTETRICS & GYNECOLOGY

## 2021-12-19 PROCEDURE — 85025 COMPLETE CBC W/AUTO DIFF WBC: CPT | Performed by: OBSTETRICS & GYNECOLOGY

## 2021-12-19 PROCEDURE — U0003 INFECTIOUS AGENT DETECTION BY NUCLEIC ACID (DNA OR RNA); SEVERE ACUTE RESPIRATORY SYNDROME CORONAVIRUS 2 (SARS-COV-2) (CORONAVIRUS DISEASE [COVID-19]), AMPLIFIED PROBE TECHNIQUE, MAKING USE OF HIGH THROUGHPUT TECHNOLOGIES AS DESCRIBED BY CMS-2020-01-R: HCPCS | Performed by: OBSTETRICS & GYNECOLOGY

## 2021-12-19 PROCEDURE — 86900 BLOOD TYPING SEROLOGIC ABO: CPT | Performed by: OBSTETRICS & GYNECOLOGY

## 2021-12-19 PROCEDURE — 86780 TREPONEMA PALLIDUM: CPT | Performed by: OBSTETRICS & GYNECOLOGY

## 2021-12-19 PROCEDURE — 25010000002 PENICILLIN G POTASSIUM PER 600000 UNITS: Performed by: OBSTETRICS & GYNECOLOGY

## 2021-12-19 PROCEDURE — 86901 BLOOD TYPING SEROLOGIC RH(D): CPT | Performed by: OBSTETRICS & GYNECOLOGY

## 2021-12-19 PROCEDURE — 86901 BLOOD TYPING SEROLOGIC RH(D): CPT

## 2021-12-19 RX ORDER — BUPIVACAINE HYDROCHLORIDE 2.5 MG/ML
INJECTION, SOLUTION EPIDURAL; INFILTRATION; INTRACAUDAL AS NEEDED
Status: DISCONTINUED | OUTPATIENT
Start: 2021-12-19 | End: 2021-12-20 | Stop reason: SURG

## 2021-12-19 RX ORDER — LIDOCAINE HYDROCHLORIDE 10 MG/ML
5 INJECTION, SOLUTION EPIDURAL; INFILTRATION; INTRACAUDAL; PERINEURAL AS NEEDED
Status: DISCONTINUED | OUTPATIENT
Start: 2021-12-19 | End: 2021-12-20 | Stop reason: HOSPADM

## 2021-12-19 RX ORDER — FENTANYL 0.2 MG/100ML-BUPIV 0.125%-NACL 0.9% EPIDURAL INJ 2/0.125 MCG/ML-%
SOLUTION INJECTION CONTINUOUS
Status: DISCONTINUED | OUTPATIENT
Start: 2021-12-19 | End: 2021-12-20

## 2021-12-19 RX ORDER — SODIUM CHLORIDE, SODIUM LACTATE, POTASSIUM CHLORIDE, CALCIUM CHLORIDE 600; 310; 30; 20 MG/100ML; MG/100ML; MG/100ML; MG/100ML
125 INJECTION, SOLUTION INTRAVENOUS CONTINUOUS
Status: DISCONTINUED | OUTPATIENT
Start: 2021-12-19 | End: 2021-12-20

## 2021-12-19 RX ORDER — OXYTOCIN-SODIUM CHLORIDE 0.9% IV SOLN 30 UNIT/500ML 30-0.9/5 UT/ML-%
2 SOLUTION INTRAVENOUS
Status: DISCONTINUED | OUTPATIENT
Start: 2021-12-19 | End: 2021-12-20 | Stop reason: HOSPADM

## 2021-12-19 RX ORDER — BUPIVACAINE HYDROCHLORIDE 2.5 MG/ML
INJECTION, SOLUTION EPIDURAL; INFILTRATION; INTRACAUDAL
Status: COMPLETED
Start: 2021-12-19 | End: 2021-12-19

## 2021-12-19 RX ORDER — OXYTOCIN-SODIUM CHLORIDE 0.9% IV SOLN 30 UNIT/500ML 30-0.9/5 UT/ML-%
2 SOLUTION INTRAVENOUS
Status: DISCONTINUED | OUTPATIENT
Start: 2021-12-19 | End: 2021-12-19

## 2021-12-19 RX ORDER — EPHEDRINE SULFATE 5 MG/ML
10 INJECTION INTRAVENOUS
Status: DISCONTINUED | OUTPATIENT
Start: 2021-12-19 | End: 2021-12-20 | Stop reason: HOSPADM

## 2021-12-19 RX ORDER — ACETAMINOPHEN 325 MG/1
650 TABLET ORAL EVERY 4 HOURS PRN
Status: DISCONTINUED | OUTPATIENT
Start: 2021-12-19 | End: 2021-12-20 | Stop reason: HOSPADM

## 2021-12-19 RX ORDER — ONDANSETRON 2 MG/ML
4 INJECTION INTRAMUSCULAR; INTRAVENOUS EVERY 6 HOURS PRN
Status: DISCONTINUED | OUTPATIENT
Start: 2021-12-19 | End: 2021-12-20 | Stop reason: HOSPADM

## 2021-12-19 RX ORDER — MORPHINE SULFATE 4 MG/ML
4 INJECTION, SOLUTION INTRAMUSCULAR; INTRAVENOUS
Status: DISCONTINUED | OUTPATIENT
Start: 2021-12-19 | End: 2021-12-20 | Stop reason: HOSPADM

## 2021-12-19 RX ORDER — FENTANYL 0.2 MG/100ML-BUPIV 0.125%-NACL 0.9% EPIDURAL INJ 2/0.125 MCG/ML-%
SOLUTION INJECTION CONTINUOUS PRN
Status: DISCONTINUED | OUTPATIENT
Start: 2021-12-19 | End: 2021-12-20 | Stop reason: SURG

## 2021-12-19 RX ORDER — SODIUM CHLORIDE 0.9 % (FLUSH) 0.9 %
3 SYRINGE (ML) INJECTION EVERY 12 HOURS SCHEDULED
Status: DISCONTINUED | OUTPATIENT
Start: 2021-12-19 | End: 2021-12-20 | Stop reason: HOSPADM

## 2021-12-19 RX ORDER — FENTANYL 0.2 MG/100ML-BUPIV 0.125%-NACL 0.9% EPIDURAL INJ 2/0.125 MCG/ML-%
SOLUTION INJECTION
Status: COMPLETED
Start: 2021-12-19 | End: 2021-12-19

## 2021-12-19 RX ORDER — ONDANSETRON 4 MG/1
4 TABLET, FILM COATED ORAL EVERY 6 HOURS PRN
Status: DISCONTINUED | OUTPATIENT
Start: 2021-12-19 | End: 2021-12-20 | Stop reason: HOSPADM

## 2021-12-19 RX ORDER — SODIUM CHLORIDE 0.9 % (FLUSH) 0.9 %
3-10 SYRINGE (ML) INJECTION AS NEEDED
Status: DISCONTINUED | OUTPATIENT
Start: 2021-12-19 | End: 2021-12-20 | Stop reason: HOSPADM

## 2021-12-19 RX ADMIN — Medication 8 ML/HR: at 22:13

## 2021-12-19 RX ADMIN — SODIUM CHLORIDE, POTASSIUM CHLORIDE, SODIUM LACTATE AND CALCIUM CHLORIDE 125 ML/HR: 600; 310; 30; 20 INJECTION, SOLUTION INTRAVENOUS at 11:25

## 2021-12-19 RX ADMIN — SODIUM CHLORIDE 5 MILLION UNITS: 900 INJECTION INTRAVENOUS at 11:26

## 2021-12-19 RX ADMIN — DINOPROSTONE 10 MG: 10 INSERT VAGINAL at 08:02

## 2021-12-19 RX ADMIN — LIDOCAINE HYDROCHLORIDE 3 ML: 10; .005 INJECTION, SOLUTION EPIDURAL; INFILTRATION; INTRACAUDAL; PERINEURAL at 22:10

## 2021-12-19 RX ADMIN — PENICILLIN G POTASSIUM 2.5 MILLION UNITS: 20000000 INJECTION, POWDER, FOR SOLUTION INTRAVENOUS at 20:12

## 2021-12-19 RX ADMIN — PENICILLIN G POTASSIUM 2.5 MILLION UNITS: 20000000 INJECTION, POWDER, FOR SOLUTION INTRAVENOUS at 15:45

## 2021-12-19 RX ADMIN — SODIUM CHLORIDE, POTASSIUM CHLORIDE, SODIUM LACTATE AND CALCIUM CHLORIDE 999 ML/HR: 600; 310; 30; 20 INJECTION, SOLUTION INTRAVENOUS at 21:47

## 2021-12-19 RX ADMIN — BUPIVACAINE HYDROCHLORIDE 5 ML: 2.5 INJECTION, SOLUTION EPIDURAL; INFILTRATION; INTRACAUDAL; PERINEURAL at 22:12

## 2021-12-20 PROCEDURE — 25010000002 MORPHINE PER 10 MG: Performed by: ANESTHESIOLOGY

## 2021-12-20 PROCEDURE — 25010000002 ONDANSETRON PER 1 MG: Performed by: ANESTHESIOLOGY

## 2021-12-20 PROCEDURE — 88307 TISSUE EXAM BY PATHOLOGIST: CPT | Performed by: OBSTETRICS & GYNECOLOGY

## 2021-12-20 PROCEDURE — 25010000002 CEFAZOLIN PER 500 MG

## 2021-12-20 PROCEDURE — 25010000002 KETOROLAC TROMETHAMINE PER 15 MG: Performed by: ANESTHESIOLOGY

## 2021-12-20 PROCEDURE — 25010000002 CHLOROPROCAINE HCL (PF) 3 % SOLUTION: Performed by: ANESTHESIOLOGY

## 2021-12-20 PROCEDURE — 0 PENICILLIN G POTASSIUM PER 600000 UNITS: Performed by: OBSTETRICS & GYNECOLOGY

## 2021-12-20 RX ORDER — SIMETHICONE 80 MG
80 TABLET,CHEWABLE ORAL 4 TIMES DAILY PRN
Status: DISCONTINUED | OUTPATIENT
Start: 2021-12-20 | End: 2021-12-22 | Stop reason: HOSPADM

## 2021-12-20 RX ORDER — DIPHENHYDRAMINE HYDROCHLORIDE 50 MG/ML
25 INJECTION INTRAMUSCULAR; INTRAVENOUS EVERY 4 HOURS PRN
Status: DISCONTINUED | OUTPATIENT
Start: 2021-12-20 | End: 2021-12-22 | Stop reason: HOSPADM

## 2021-12-20 RX ORDER — DIPHENHYDRAMINE HCL 25 MG
25 CAPSULE ORAL EVERY 4 HOURS PRN
Status: DISCONTINUED | OUTPATIENT
Start: 2021-12-20 | End: 2021-12-22 | Stop reason: HOSPADM

## 2021-12-20 RX ORDER — KETOROLAC TROMETHAMINE 30 MG/ML
30 INJECTION, SOLUTION INTRAMUSCULAR; INTRAVENOUS EVERY 6 HOURS
Status: COMPLETED | OUTPATIENT
Start: 2021-12-20 | End: 2021-12-21

## 2021-12-20 RX ORDER — OXYTOCIN-SODIUM CHLORIDE 0.9% IV SOLN 30 UNIT/500ML 30-0.9/5 UT/ML-%
999 SOLUTION INTRAVENOUS ONCE
Status: COMPLETED | OUTPATIENT
Start: 2021-12-20 | End: 2021-12-20

## 2021-12-20 RX ORDER — KETOROLAC TROMETHAMINE 30 MG/ML
INJECTION, SOLUTION INTRAMUSCULAR; INTRAVENOUS AS NEEDED
Status: DISCONTINUED | OUTPATIENT
Start: 2021-12-20 | End: 2021-12-20 | Stop reason: SURG

## 2021-12-20 RX ORDER — SODIUM CHLORIDE, SODIUM LACTATE, POTASSIUM CHLORIDE, CALCIUM CHLORIDE 600; 310; 30; 20 MG/100ML; MG/100ML; MG/100ML; MG/100ML
125 INJECTION, SOLUTION INTRAVENOUS ONCE
Status: COMPLETED | OUTPATIENT
Start: 2021-12-20 | End: 2021-12-20

## 2021-12-20 RX ORDER — LANOLIN 100 %
OINTMENT (GRAM) TOPICAL
Status: DISCONTINUED | OUTPATIENT
Start: 2021-12-20 | End: 2021-12-22 | Stop reason: HOSPADM

## 2021-12-20 RX ORDER — ONDANSETRON 4 MG/1
4 TABLET, FILM COATED ORAL EVERY 6 HOURS PRN
Status: DISCONTINUED | OUTPATIENT
Start: 2021-12-20 | End: 2021-12-20 | Stop reason: HOSPADM

## 2021-12-20 RX ORDER — MISOPROSTOL 200 UG/1
800 TABLET ORAL AS NEEDED
Status: DISCONTINUED | OUTPATIENT
Start: 2021-12-20 | End: 2021-12-20 | Stop reason: HOSPADM

## 2021-12-20 RX ORDER — NALBUPHINE HCL 10 MG/ML
2.5 AMPUL (ML) INJECTION EVERY 4 HOURS PRN
Status: ACTIVE | OUTPATIENT
Start: 2021-12-20 | End: 2021-12-21

## 2021-12-20 RX ORDER — CHLOROPROCAINE HYDROCHLORIDE 30 MG/ML
INJECTION, SOLUTION EPIDURAL; INFILTRATION; INTRACAUDAL; PERINEURAL AS NEEDED
Status: DISCONTINUED | OUTPATIENT
Start: 2021-12-20 | End: 2021-12-20 | Stop reason: SURG

## 2021-12-20 RX ORDER — SODIUM CHLORIDE 9 MG/ML
999 INJECTION, SOLUTION INTRAVENOUS CONTINUOUS
Status: DISCONTINUED | OUTPATIENT
Start: 2021-12-20 | End: 2021-12-20

## 2021-12-20 RX ORDER — MORPHINE SULFATE 2 MG/ML
2 INJECTION, SOLUTION INTRAMUSCULAR; INTRAVENOUS
Status: ACTIVE | OUTPATIENT
Start: 2021-12-20 | End: 2021-12-21

## 2021-12-20 RX ORDER — OXYCODONE HYDROCHLORIDE 5 MG/1
5 TABLET ORAL EVERY 4 HOURS PRN
Status: DISCONTINUED | OUTPATIENT
Start: 2021-12-20 | End: 2021-12-22 | Stop reason: HOSPADM

## 2021-12-20 RX ORDER — IBUPROFEN 600 MG/1
600 TABLET ORAL EVERY 6 HOURS PRN
Status: DISCONTINUED | OUTPATIENT
Start: 2021-12-20 | End: 2021-12-20 | Stop reason: HOSPADM

## 2021-12-20 RX ORDER — BUTORPHANOL TARTRATE 1 MG/ML
0.5 INJECTION, SOLUTION INTRAMUSCULAR; INTRAVENOUS EVERY 6 HOURS PRN
Status: DISCONTINUED | OUTPATIENT
Start: 2021-12-20 | End: 2021-12-22 | Stop reason: HOSPADM

## 2021-12-20 RX ORDER — IBUPROFEN 600 MG/1
600 TABLET ORAL EVERY 6 HOURS SCHEDULED
Status: DISCONTINUED | OUTPATIENT
Start: 2021-12-21 | End: 2021-12-22 | Stop reason: HOSPADM

## 2021-12-20 RX ORDER — OXYTOCIN-SODIUM CHLORIDE 0.9% IV SOLN 30 UNIT/500ML 30-0.9/5 UT/ML-%
125 SOLUTION INTRAVENOUS CONTINUOUS PRN
Status: COMPLETED | OUTPATIENT
Start: 2021-12-20 | End: 2021-12-20

## 2021-12-20 RX ORDER — ACETAMINOPHEN 325 MG/1
650 TABLET ORAL EVERY 6 HOURS
Status: COMPLETED | OUTPATIENT
Start: 2021-12-20 | End: 2021-12-21

## 2021-12-20 RX ORDER — TRISODIUM CITRATE DIHYDRATE AND CITRIC ACID MONOHYDRATE 500; 334 MG/5ML; MG/5ML
30 SOLUTION ORAL ONCE
Status: COMPLETED | OUTPATIENT
Start: 2021-12-20 | End: 2021-12-20

## 2021-12-20 RX ORDER — CARBOPROST TROMETHAMINE 250 UG/ML
250 INJECTION, SOLUTION INTRAMUSCULAR AS NEEDED
Status: DISCONTINUED | OUTPATIENT
Start: 2021-12-20 | End: 2021-12-20 | Stop reason: HOSPADM

## 2021-12-20 RX ORDER — ONDANSETRON 2 MG/ML
4 INJECTION INTRAMUSCULAR; INTRAVENOUS ONCE AS NEEDED
Status: ACTIVE | OUTPATIENT
Start: 2021-12-20 | End: 2021-12-21

## 2021-12-20 RX ORDER — DOCUSATE SODIUM 100 MG/1
100 CAPSULE, LIQUID FILLED ORAL 2 TIMES DAILY PRN
Status: DISCONTINUED | OUTPATIENT
Start: 2021-12-20 | End: 2021-12-22 | Stop reason: HOSPADM

## 2021-12-20 RX ORDER — TRISODIUM CITRATE DIHYDRATE AND CITRIC ACID MONOHYDRATE 500; 334 MG/5ML; MG/5ML
SOLUTION ORAL
Status: COMPLETED
Start: 2021-12-20 | End: 2021-12-20

## 2021-12-20 RX ORDER — ONDANSETRON 2 MG/ML
INJECTION INTRAMUSCULAR; INTRAVENOUS AS NEEDED
Status: DISCONTINUED | OUTPATIENT
Start: 2021-12-20 | End: 2021-12-20 | Stop reason: SURG

## 2021-12-20 RX ORDER — SODIUM CHLORIDE, SODIUM LACTATE, POTASSIUM CHLORIDE, CALCIUM CHLORIDE 600; 310; 30; 20 MG/100ML; MG/100ML; MG/100ML; MG/100ML
INJECTION, SOLUTION INTRAVENOUS CONTINUOUS PRN
Status: DISCONTINUED | OUTPATIENT
Start: 2021-12-20 | End: 2021-12-20 | Stop reason: SURG

## 2021-12-20 RX ORDER — OXYCODONE HYDROCHLORIDE 5 MG/1
10 TABLET ORAL EVERY 4 HOURS PRN
Status: DISCONTINUED | OUTPATIENT
Start: 2021-12-20 | End: 2021-12-22 | Stop reason: HOSPADM

## 2021-12-20 RX ORDER — PHENYLEPHRINE HYDROCHLORIDE 10 MG/ML
INJECTION INTRAVENOUS AS NEEDED
Status: DISCONTINUED | OUTPATIENT
Start: 2021-12-20 | End: 2021-12-20 | Stop reason: SURG

## 2021-12-20 RX ORDER — MORPHINE SULFATE 1 MG/ML
INJECTION, SOLUTION EPIDURAL; INTRATHECAL; INTRAVENOUS AS NEEDED
Status: DISCONTINUED | OUTPATIENT
Start: 2021-12-20 | End: 2021-12-20 | Stop reason: SURG

## 2021-12-20 RX ORDER — METHYLERGONOVINE MALEATE 0.2 MG/ML
200 INJECTION INTRAVENOUS ONCE AS NEEDED
Status: DISCONTINUED | OUTPATIENT
Start: 2021-12-20 | End: 2021-12-20 | Stop reason: HOSPADM

## 2021-12-20 RX ORDER — ACETAMINOPHEN 325 MG/1
650 TABLET ORAL EVERY 4 HOURS PRN
Status: DISCONTINUED | OUTPATIENT
Start: 2021-12-20 | End: 2021-12-20 | Stop reason: HOSPADM

## 2021-12-20 RX ORDER — CHLOROPROCAINE HYDROCHLORIDE 30 MG/ML
INJECTION, SOLUTION EPIDURAL; INFILTRATION; INTRACAUDAL; PERINEURAL
Status: COMPLETED
Start: 2021-12-20 | End: 2021-12-20

## 2021-12-20 RX ORDER — ACETAMINOPHEN 325 MG/1
650 TABLET ORAL EVERY 6 HOURS
Status: DISCONTINUED | OUTPATIENT
Start: 2021-12-21 | End: 2021-12-20

## 2021-12-20 RX ORDER — ONDANSETRON 2 MG/ML
4 INJECTION INTRAMUSCULAR; INTRAVENOUS EVERY 6 HOURS PRN
Status: DISCONTINUED | OUTPATIENT
Start: 2021-12-20 | End: 2021-12-20 | Stop reason: HOSPADM

## 2021-12-20 RX ORDER — OXYTOCIN-SODIUM CHLORIDE 0.9% IV SOLN 30 UNIT/500ML 30-0.9/5 UT/ML-%
250 SOLUTION INTRAVENOUS CONTINUOUS
Status: ACTIVE | OUTPATIENT
Start: 2021-12-20 | End: 2021-12-20

## 2021-12-20 RX ADMIN — SODIUM CITRATE AND CITRIC ACID MONOHYDRATE 30 ML: 500; 334 SOLUTION ORAL at 02:32

## 2021-12-20 RX ADMIN — MORPHINE SULFATE 3 MG: 1 INJECTION EPIDURAL; INTRATHECAL; INTRAVENOUS at 03:05

## 2021-12-20 RX ADMIN — KETOROLAC TROMETHAMINE 30 MG: 30 INJECTION, SOLUTION INTRAMUSCULAR; INTRAVENOUS at 16:18

## 2021-12-20 RX ADMIN — SODIUM CHLORIDE, POTASSIUM CHLORIDE, SODIUM LACTATE AND CALCIUM CHLORIDE 125 ML/HR: 600; 310; 30; 20 INJECTION, SOLUTION INTRAVENOUS at 10:31

## 2021-12-20 RX ADMIN — SODIUM CHLORIDE 500 ML: 9 INJECTION, SOLUTION INTRAVENOUS at 00:02

## 2021-12-20 RX ADMIN — PHENYLEPHRINE HYDROCHLORIDE 100 MG: 10 INJECTION INTRAVENOUS at 02:52

## 2021-12-20 RX ADMIN — CEFAZOLIN SODIUM 2 G: 10 INJECTION, POWDER, FOR SOLUTION INTRAVENOUS at 02:32

## 2021-12-20 RX ADMIN — CHLOROPROCAINE HYDROCHLORIDE 5 ML: 30 INJECTION, SOLUTION EPIDURAL; INFILTRATION; INTRACAUDAL; PERINEURAL at 02:41

## 2021-12-20 RX ADMIN — CHLOROPROCAINE HYDROCHLORIDE 10 ML: 30 INJECTION, SOLUTION EPIDURAL; INFILTRATION; INTRACAUDAL; PERINEURAL at 02:39

## 2021-12-20 RX ADMIN — Medication 2 G: at 02:32

## 2021-12-20 RX ADMIN — DOCUSATE SODIUM 100 MG: 100 CAPSULE, LIQUID FILLED ORAL at 22:45

## 2021-12-20 RX ADMIN — PHENYLEPHRINE HYDROCHLORIDE 200 MG: 10 INJECTION INTRAVENOUS at 03:07

## 2021-12-20 RX ADMIN — ACETAMINOPHEN 650 MG: 325 TABLET, FILM COATED ORAL at 19:19

## 2021-12-20 RX ADMIN — PHENYLEPHRINE HYDROCHLORIDE 200 MG: 10 INJECTION INTRAVENOUS at 03:14

## 2021-12-20 RX ADMIN — ONDANSETRON 4 MG: 2 INJECTION INTRAMUSCULAR; INTRAVENOUS at 02:52

## 2021-12-20 RX ADMIN — KETOROLAC TROMETHAMINE 30 MG: 30 INJECTION, SOLUTION INTRAMUSCULAR at 03:06

## 2021-12-20 RX ADMIN — CHLOROPROCAINE HYDROCHLORIDE 5 ML: 30 INJECTION, SOLUTION EPIDURAL; INFILTRATION; INTRACAUDAL; PERINEURAL at 02:43

## 2021-12-20 RX ADMIN — ACETAMINOPHEN 650 MG: 325 TABLET, FILM COATED ORAL at 07:39

## 2021-12-20 RX ADMIN — OXYTOCIN 125 ML/HR: 10 INJECTION INTRAVENOUS at 05:35

## 2021-12-20 RX ADMIN — SODIUM CHLORIDE, SODIUM LACTATE, POTASSIUM CHLORIDE, AND CALCIUM CHLORIDE: .6; .31; .03; .02 INJECTION, SOLUTION INTRAVENOUS at 02:41

## 2021-12-20 RX ADMIN — KETOROLAC TROMETHAMINE 30 MG: 30 INJECTION, SOLUTION INTRAMUSCULAR; INTRAVENOUS at 22:46

## 2021-12-20 RX ADMIN — SODIUM CHLORIDE, POTASSIUM CHLORIDE, SODIUM LACTATE AND CALCIUM CHLORIDE 125 ML/HR: 600; 310; 30; 20 INJECTION, SOLUTION INTRAVENOUS at 01:49

## 2021-12-20 RX ADMIN — OXYTOCIN 0.2 UNITS/MIN: 10 INJECTION INTRAVENOUS at 03:01

## 2021-12-20 RX ADMIN — ACETAMINOPHEN 650 MG: 325 TABLET, FILM COATED ORAL at 13:13

## 2021-12-20 RX ADMIN — TRISODIUM CITRATE DIHYDRATE AND CITRIC ACID MONOHYDRATE 30 ML: 500; 334 SOLUTION ORAL at 02:32

## 2021-12-20 RX ADMIN — OXYTOCIN-SODIUM CHLORIDE 0.9% IV SOLN 30 UNIT/500ML 999 ML/HR: 30-0.9/5 SOLUTION at 03:50

## 2021-12-20 RX ADMIN — KETOROLAC TROMETHAMINE 30 MG: 30 INJECTION, SOLUTION INTRAMUSCULAR; INTRAVENOUS at 10:22

## 2021-12-20 RX ADMIN — PENICILLIN G POTASSIUM 2.5 MILLION UNITS: 20000000 INJECTION, POWDER, FOR SOLUTION INTRAVENOUS at 00:21

## 2021-12-20 NOTE — ANESTHESIA PROCEDURE NOTES
Labor Epidural    Pre-sedation assessment completed: 12/19/2021 9:54 PM    Patient location during procedure: OB  Start Time: 12/19/2021 9:54 PM  Stop Time: 12/19/2021 10:10 PM  Indication:at surgeon's request  Performed By  Anesthesiologist: Thomas Boothe MD  Preanesthetic Checklist  Completed: patient identified, IV checked, site marked, risks and benefits discussed, surgical consent, monitors and equipment checked, pre-op evaluation and timeout performed  Prep:  Pt Position:sitting  Sterile Tech:cap, gloves, mask and sterile barrier  Prep:chlorhexidine gluconate and isopropyl alcohol  Monitoring:blood pressure monitoring and continuous pulse oximetry  Epidural Block Procedure:  Approach:midline  Guidance:palpation technique  Location:L3-L4  Needle Type:Tuohy  Needle Gauge:17 G  Loss of Resistance Medium: saline  Loss of Resistance: 4cm  Cath Depth at skin:10 cm  Paresthesia: none  Aspiration:negative  Test Dose:negative  Med administered at 12/19/2021 10:10 PM  Number of Attempts: 1  Post Assessment:  Dressing:occlusive dressing applied and secured with tape  Pt Tolerance:patient tolerated the procedure well with no apparent complications  Complications:no

## 2021-12-20 NOTE — PLAN OF CARE
Goal Outcome Evaluation: Pt delivered viable infant girl at 0259 with apgars of 8,9

## 2021-12-20 NOTE — NURSING NOTE
Call placed to Dr. Samir OLIVARES updated on patient cervical exam and contraction pattern. Order for patient to receive epidural and hold pitocin at this time. Start pitocin if needed after epidural.

## 2021-12-20 NOTE — OP NOTE
[facility]   Section Operative Note    Pre-Operative Dx:   1.  Patient is a 26 y.o.  at 40w0d    2. fetal distress      Post-Operative Dx:    1.  Same     Procedure: Primary    Surgeon:  Assistant: Clair Dawson MD   Anesthesia:  Anesthesiologist:  Henry Boothe     EBL:  800cc     Antibiotics: cefazolin (Ancef)     Infant    Findings: LVFI  AB.314/0.1  VB.343/-0.4       Apgars:    8 @ 1 minute /   9 @ 5 minutes          Procedure Details:     Pt was taken to the OR where she was prepped and draped in the usual sterile fashion with a catheter and a left tilt.  Anesthesia was found to be adequate.    A Pfannenstiel skin incision was made with the scalpel and carried through to the underlying layer of fascia with the scalpel.  The fascial incision was extended laterally with the Alonso scissors and  from the underlying rectus muscles superiorly and inferiorly.  The rectus muscles were  in the midline and the peritoneum was entered bluntly.  The peritoneal incision was extended laterally and the Chris retractor was placed.  The bladder flap was created sharply.    A low transverse uterine incision was made with the scalpel and extended manually.    The infant's head, shoulders, and body were delivered without difficulty.  Nose and mouth were bulb suctioned.  The cord was clamped and cut.  The infant was shown to the parents then handed to awaiting nurse with good cry, color, tone, and movement of all extremities.   Cord gasses and blood were obtained.   Placenta was delivered spontaneously intact with a three vessel cord.    The uterus was exteriorized and cleared of all clots and debris.    The uterine incision was repaired with 0 Monocryl in a running, locked fashion and excellent hemostasis was achieved.   The posterior cul de sac was irrigated.   The uterus was returned to the abdomen, the gutters were cleared of all clots and debris.  The uterine  incision was examined and was hemostatic.     The peritoneum was reapproximated with 3.0 Monocryl in a running fashion.  The rectus muscles were reapproximated with 0 Monocryl in several simple interrupted sutures.    The fascia was closed with 0 Vicryl in a running fashion.    The subcutaneous space was irrigated and closed with 3.0 Monocryl.    The skin was closed with staples.  Sponge, lap, needle and instrument counts were correct x 2.        Complications:   None      Disposition:   Recovery room then postpartum.          Clair Dawson MD  12/20/2021  03:27 EST

## 2021-12-20 NOTE — LACTATION NOTE
This note was copied from a baby's chart.  Provided mother with handouts, nipple cream and gel pads. Basic teaching done. Denies breast surgery. Denies use of routine medication. Denies wool allergy. Has an Thalia pump at home. Declines DVD. Baby is in nursery for observation. Mom is too nauseated and lightheaded to go to nursery. Provided with Symphony breastpump. I pumped with her for 15mins. Obtained 6cc colostrum. Praised efforts. Dad fed colostrum to baby in nursery. I instructed her on use and cleaning of pump. Encouraged to call as needed.

## 2021-12-20 NOTE — PROGRESS NOTES
Pt continues to have variable and late decelerations despite oxygen, multiple position changes and amnioinfusion. 6 cm. Will proceed with CS. Pt and partner agree.

## 2021-12-20 NOTE — PLAN OF CARE
Goal Outcome Evaluation:      Pt tolerating minimal pain with toradol and tylenol. Pt drinking water r/t urine was dark rosa. Pt urine is getting lighter with water intake and IV fluids. Pt bonding with baby, FOB attentive to pt at bedside. Continue to monitor

## 2021-12-20 NOTE — H&P
MARLENE Zhao  Obstetric History and Physical     Chief Complaint: IOL    Subjective     Patient is a 26 y.o. female  currently at 40w0d, who presents for IOL.    Her prenatal care is c/b GBS positive.      Prenatal Information:  External Prenatal Results     Pregnancy Outside Results - Transcribed From Office Records - See Scanned Records For Details     Test Value Date Time    ABO  AB  21 0825    Rh  Positive  21 0825    Antibody Screen  Negative  21 0825    Varicella IgG ^ 6.0 AI 18 1137    Rubella ^ Immune  21     Hgb  13.6 g/dL 21 0825    Hct  39.9 % 21 0825    Glucose Fasting GTT       Glucose Tolerance Test 1 hour       Glucose Tolerance Test 3 hour       Gonorrhea (discrete)       Chlamydia (discrete)       RPR ^ Non-Reactive  21     VDRL       Syphilis Antibody       HBsAg ^ Negative  21     Herpes Simplex Virus PCR       Herpes Simplex VIrus Culture       HIV ^ Negative  21     Hep C RNA Quant PCR       Hep C Antibody  Non-Reactive  20 1052    AFP       Group B Strep ^ POS  21     GBS Susceptibility to Clindamycin       GBS Susceptibility to Erythromycin       Fetal Fibronectin       Genetic Testing, Maternal Blood             Drug Screening     Test Value Date Time    Urine Drug Screen       Amphetamine Screen       Barbiturate Screen  Negative  21 0925    Benzodiazepine Screen  Negative  21 0925    Methadone Screen  Negative  21 0925    Phencyclidine Screen       Opiates Screen  Negative  21 0925    THC Screen  Negative  21 0925    Cocaine Screen       Propoxyphene Screen       Buprenorphine Screen       Methamphetamine Screen       Oxycodone Screen  Negative  21 0925    Tricyclic Antidepressants Screen             Legend    ^: Historical                         Past OB History:    none       Past Medical History: History reviewed. No pertinent past medical history.     Past Surgical History Past  Surgical History:   Procedure Laterality Date   • APPENDECTOMY      2007   • EYE SURGERY  2018    Lasik   • GUM SURGERY     • WISDOM TOOTH EXTRACTION          Family History: Family History   Problem Relation Age of Onset   • Hyperlipidemia Father    • Hypertension Father    • Breast cancer Paternal Grandmother    • Hyperlipidemia Paternal Grandmother    • Hypertension Paternal Grandmother    • Cancer Paternal Grandmother         Breast, Ureter   • Alcohol abuse Maternal Grandmother    • Cancer Paternal Grandfather         Lung      Social History:  reports that she has never smoked. She has never used smokeless tobacco.   reports previous alcohol use.   reports no history of drug use.        General ROS: Pertinent items are noted in HPI    Objective      Vitals:     Vitals:    12/19/21 2355 12/20/21 0000 12/20/21 0005 12/20/21 0006   BP:  124/79     BP Location:       Patient Position:       Pulse: 67 68 75 75   Resp:       Temp:       TempSrc:       SpO2: 99% 100% 99%    Weight:       Height:           Fetal Heart Rate Assessment:   130, mod variability    Humphrey:   Every 3-4 min     Physical Exam:     General Appearance:    Alert, cooperative, in no acute distress   Abdomen:     Soft, non-tender, EFW 7.5-8 lbs   Pelvic Exam:    Presentation: vtx    Cervix: was checked (by me): 4 cm / complete % / -1, AROM, clear fluid, IUPC placed without difficulty, pelvis clinically adequate   Extremities:   Moves all extremities well   Skin:   No bleeding, bruising or rash         Laboratory Results:   Lab Results (last 48 hours)     Procedure Component Value Units Date/Time    Urine Drug Screen - [[id number]]  (Normal) Collected: 12/19/21 0925    Specimen: Urine Updated: 12/19/21 1034     Amphet/Methamphet, Screen Negative     Barbiturates Screen, Urine Negative     Benzodiazepine Screen, Urine Negative     Cocaine Screen, Urine Negative     Opiate Screen Negative     THC, Screen, Urine Negative     Methadone Screen, Urine  Negative     Oxycodone Screen, Urine Negative    Narrative:      Negative Thresholds Per Drugs Screened:    Amphetamines                 500 ng/ml  Barbiturates                 200 ng/ml  Benzodiazepines              100 ng/ml  Cocaine                      300 ng/ml  Methadone                    300 ng/ml  Opiates                      300 ng/ml  Oxycodone                    100 ng/ml  THC                           50 ng/ml    The Normal Value for all drugs tested is negative. This report includes final unconfirmed screening results to be used for medical treatment purposes only. Unconfirmed results must not be used for non-medical purposes such as employment or legal testing. Clinical consideration should be applied to any drug of abuse test, particularly when unconfirmed results are used.          All urine drugs of abuse requests without chain of custody are for medical screening purposes only.  False positives are possible.      COVID PRE-OP / PRE-PROCEDURE SCREENING ORDER (NO ISOLATION) - Swab, Nasopharynx [[id number]]  (Normal) Collected: 12/19/21 0825    Specimen: Swab from Nasopharynx Updated: 12/19/21 0907    Narrative:      The following orders were created for panel order COVID PRE-OP / PRE-PROCEDURE SCREENING ORDER (NO ISOLATION) - Swab, Nasopharynx.  Procedure                               Abnormality         Status                     ---------                               -----------         ------                     COVID-19,CEPHEID/PATRICK,CO...[[id number]]  Normal              Final result                 Please view results for these tests on the individual orders.    COVID-19,CEPHEID/PATRICK,COR/TONI/PAD/RANJEET IN-HOUSE(OR EMERGENT/ADD-ON),NP SWAB IN TRANSPORT MEDIA 3-4 HR TAT, RT-PCR - Swab, Nasopharynx [[id number]]  (Normal) Collected: 12/19/21 0825    Specimen: Swab from Nasopharynx Updated: 12/19/21 0907     COVID19 Not Detected    Narrative:      Fact sheet for providers:  https://www.fda.gov/media/189096/download     Fact sheet for patients: https://www.fda.gov/media/307925/download  Fact sheet for providers: https://www.fda.gov/media/297675/download     Fact sheet for patients: https://www.fda.gov/media/613550/download    CBC & Differential [[id number]]  (Abnormal) Collected: 12/19/21 0825    Specimen: Blood Updated: 12/19/21 0832    Narrative:      The following orders were created for panel order CBC & Differential.  Procedure                               Abnormality         Status                     ---------                               -----------         ------                     CBC Auto Differential[[id number]]        Abnormal            Final result                 Please view results for these tests on the individual orders.    CBC Auto Differential [[id number]]  (Abnormal) Collected: 12/19/21 0825    Specimen: Blood Updated: 12/19/21 0832     WBC 7.60 10*3/mm3      RBC 4.33 10*6/mm3      Hemoglobin 13.6 g/dL      Hematocrit 39.9 %      MCV 92.2 fL      MCH 31.5 pg      MCHC 34.2 g/dL      RDW 13.4 %      RDW-SD 42.4 fl      MPV 9.8 fL      Platelets 163 10*3/mm3      Neutrophil % 71.0 %      Lymphocyte % 18.5 %      Monocyte % 7.2 %      Eosinophil % 2.8 %      Basophil % 0.5 %      Neutrophils, Absolute 5.40 10*3/mm3      Lymphocytes, Absolute 1.40 10*3/mm3      Monocytes, Absolute 0.60 10*3/mm3      Eosinophils, Absolute 0.20 10*3/mm3      Basophils, Absolute 0.00 10*3/mm3      nRBC 0.0 /100 WBC     HIV-1 Antibody, EIA [[id number]] Resulted: 05/14/21    Specimen: Blood Updated: 12/19/21 0832     External HIV Antibody Negative    Hepatitis B Surface Antigen [[id number]] Resulted: 05/14/21    Specimen: Blood Updated: 12/19/21 0832     External Hepatitis B Surface Ag Negative    RPR [[id number]] Resulted: 05/14/21    Specimen: Blood Updated: 12/19/21 0832     External RPR Non-Reactive    Rubella Antibody, IgG [[id number]] Resulted: 05/14/21    Specimen: Blood Updated:  12/19/21 0832     External Rubella Qual Immune    Group B Streptococcus Culture - Swab, Vaginal/Rectum [[id number]] Resulted: 11/22/21    Specimen: Swab from Vaginal/Rectum Updated: 12/19/21 0830     External Strep Group B Ag POS    T Pallidum Antibody (FTA-Ab) [[id number]] Collected: 12/19/21 0825    Specimen: Blood Updated: 12/19/21 0826             Assessment/Plan     Active Problems:    Encounter for induction of labor         Assessment:  1.  Intrauterine pregnancy at 40w0d gestation with reactive fetal status.    2.   IOL at term  3.  GBS status:   External Strep Group B Ag   Date Value Ref Range Status   11/22/2021 POS  Final     4. FSR    Plan:  1. Vaginal anticipated  2. PCN G for GBS prophylaxis       Clair Dawson MD   12/20/2021   00:23 EST

## 2021-12-20 NOTE — PAYOR COMM NOTE
"  Salma Grayson (26 y.o. Female)             Date of Birth Social Security Number Address Home Phone MRN    1995  [id number] [address] [phone] [id number]    Orthodox Marital Status             Unknown        Admission Date Admission Type Admitting Provider Attending Provider Department, Room/Bed    21 Elective Clair Dawson MD Allen, Melissa Jarles, MD [facility] MOTHER BABY, M419/1    Discharge Date Discharge Disposition Discharge Destination                         Attending Provider: Clair Dawson MD    Allergies: No Known Allergies    Isolation: None   Infection: None   Code Status: CPR   Advance Care Planning Activity    Ht: 165.1 cm (65\")   Wt: 74.1 kg (163 lb 5.8 oz)    Admission Cmt: None   Principal Problem: None                Active Insurance as of 2021     Primary Coverage     Payor Plan Insurance Group Employer/Plan Group    ANTHEM BLUE CROSS ANTHEM BLUE CROSS BLUE SHIELD PPO [id number]     Payor Plan Address Payor Plan Phone Number Payor Plan Fax Number Effective Dates    [address] [phone]  2017 - None Entered    [facility] [id number]       Subscriber Name Subscriber Birth Date Member ID       TRUNG GRAYSON 1989 [id number]                 Emergency Contacts      (Rel.) Home Phone Work Phone Mobile Phone    RENUKATRUNG (Spouse) [phone] -- --               History & Physical      Clair Dawson MD at 21 0023          [facility]  Obstetric History and Physical     Chief Complaint: IOL    Subjective     Patient is a 26 y.o. female  currently at 40w0d, who presents for IOL.    Her prenatal care is c/b GBS positive.      Prenatal Information:  External Prenatal Results     Pregnancy Outside Results - Transcribed From Office Records - See Scanned Records For Details     Test Value Date Time    ABO  AB  21 0825    Rh  Positive  21 0825    Antibody Screen  " Negative  12/19/21 0825    Varicella IgG ^ 6.0 AI 01/29/18 1137    Rubella ^ Immune  05/14/21     Hgb  13.6 g/dL 12/19/21 0825    Hct  39.9 % 12/19/21 0825    Glucose Fasting GTT       Glucose Tolerance Test 1 hour       Glucose Tolerance Test 3 hour       Gonorrhea (discrete)       Chlamydia (discrete)       RPR ^ Non-Reactive  05/14/21     VDRL       Syphilis Antibody       HBsAg ^ Negative  05/14/21     Herpes Simplex Virus PCR       Herpes Simplex VIrus Culture       HIV ^ Negative  05/14/21     Hep C RNA Quant PCR       Hep C Antibody  Non-Reactive  08/26/20 1052    AFP       Group B Strep ^ POS  11/22/21     GBS Susceptibility to Clindamycin       GBS Susceptibility to Erythromycin       Fetal Fibronectin       Genetic Testing, Maternal Blood             Drug Screening     Test Value Date Time    Urine Drug Screen       Amphetamine Screen       Barbiturate Screen  Negative  12/19/21 0925    Benzodiazepine Screen  Negative  12/19/21 0925    Methadone Screen  Negative  12/19/21 0925    Phencyclidine Screen       Opiates Screen  Negative  12/19/21 0925    THC Screen  Negative  12/19/21 0925    Cocaine Screen       Propoxyphene Screen       Buprenorphine Screen       Methamphetamine Screen       Oxycodone Screen  Negative  12/19/21 0925    Tricyclic Antidepressants Screen             Legend    ^: Historical                         Past OB History:    none       Past Medical History: History reviewed. No pertinent past medical history.     Past Surgical History Past Surgical History:   Procedure Laterality Date   • APPENDECTOMY      2007   • EYE SURGERY  2018    Lasik   • GUM SURGERY     • WISDOM TOOTH EXTRACTION          Family History: Family History   Problem Relation Age of Onset   • Hyperlipidemia Father    • Hypertension Father    • Breast cancer Paternal Grandmother    • Hyperlipidemia Paternal Grandmother    • Hypertension Paternal Grandmother    • Cancer Paternal Grandmother         Breast, Ureter   •  Alcohol abuse Maternal Grandmother    • Cancer Paternal Grandfather         Lung      Social History:  reports that she has never smoked. She has never used smokeless tobacco.   reports previous alcohol use.   reports no history of drug use.        General ROS: Pertinent items are noted in HPI    Objective      Vitals:     Vitals:    12/19/21 2355 12/20/21 0000 12/20/21 0005 12/20/21 0006   BP:  124/79     BP Location:       Patient Position:       Pulse: 67 68 75 75   Resp:       Temp:       TempSrc:       SpO2: 99% 100% 99%    Weight:       Height:           Fetal Heart Rate Assessment:   130, mod variability    [place]:   Every 3-4 min     Physical Exam:     General Appearance:    Alert, cooperative, in no acute distress   Abdomen:     Soft, non-tender, EFW 7.5-8 lbs   Pelvic Exam:    Presentation: vtx    Cervix: was checked (by me): 4 cm / complete % / -1, AROM, clear fluid, IUPC placed without difficulty, pelvis clinically adequate   Extremities:   Moves all extremities well   Skin:   No bleeding, bruising or rash         Laboratory Results:   Lab Results (last 48 hours)     Procedure Component Value Units Date/Time    Urine Drug Screen - [[id number]]  (Normal) Collected: 12/19/21 0925    Specimen: Urine Updated: 12/19/21 1034     Amphet/Methamphet, Screen Negative     Barbiturates Screen, Urine Negative     Benzodiazepine Screen, Urine Negative     Cocaine Screen, Urine Negative     Opiate Screen Negative     THC, Screen, Urine Negative     Methadone Screen, Urine Negative     Oxycodone Screen, Urine Negative    Narrative:      Negative Thresholds Per Drugs Screened:    Amphetamines                 500 ng/ml  Barbiturates                 200 ng/ml  Benzodiazepines              100 ng/ml  Cocaine                      300 ng/ml  Methadone                    300 ng/ml  Opiates                      300 ng/ml  Oxycodone                    100 ng/ml  THC                           50 ng/ml    The Normal Value for all  drugs tested is negative. This report includes final unconfirmed screening results to be used for medical treatment purposes only. Unconfirmed results must not be used for non-medical purposes such as employment or legal testing. Clinical consideration should be applied to any drug of abuse test, particularly when unconfirmed results are used.          All urine drugs of abuse requests without chain of custody are for medical screening purposes only.  False positives are possible.      COVID PRE-OP / PRE-PROCEDURE SCREENING ORDER (NO ISOLATION) - Swab, Nasopharynx [[id number]]  (Normal) Collected: 12/19/21 0825    Specimen: Swab from Nasopharynx Updated: 12/19/21 0907    Narrative:      The following orders were created for panel order COVID PRE-OP / PRE-PROCEDURE SCREENING ORDER (NO ISOLATION) - Swab, Nasopharynx.  Procedure                               Abnormality         Status                     ---------                               -----------         ------                     COVID-19,CEPHEID/PATRICK,CO...[[id number]]  Normal              Final result                 Please view results for these tests on the individual orders.    COVID-19,CEPHEID/PATRICK,COR/TONI/PAD/RANJEET IN-HOUSE(OR EMERGENT/ADD-ON),NP SWAB IN TRANSPORT MEDIA 3-4 HR TAT, RT-PCR - Swab, Nasopharynx [[id number]]  (Normal) Collected: 12/19/21 0825    Specimen: Swab from Nasopharynx Updated: 12/19/21 0907     COVID19 Not Detected    Narrative:      Fact sheet for providers: https://www.fda.gov/media/712245/download     Fact sheet for patients: https://www.fda.gov/media/670081/download  Fact sheet for providers: https://www.fda.gov/media/686814/download     Fact sheet for patients: https://www.fda.gov/media/530423/download    CBC & Differential [[id number]]  (Abnormal) Collected: 12/19/21 0825    Specimen: Blood Updated: 12/19/21 0832    Narrative:      The following orders were created for panel order CBC & Differential.  Procedure                                Abnormality         Status                     ---------                               -----------         ------                     CBC Auto Differential[[id number]]        Abnormal            Final result                 Please view results for these tests on the individual orders.    CBC Auto Differential [[id number]]  (Abnormal) Collected: 12/19/21 0825    Specimen: Blood Updated: 12/19/21 0832     WBC 7.60 10*3/mm3      RBC 4.33 10*6/mm3      Hemoglobin 13.6 g/dL      Hematocrit 39.9 %      MCV 92.2 fL      MCH 31.5 pg      MCHC 34.2 g/dL      RDW 13.4 %      RDW-SD 42.4 fl      MPV 9.8 fL      Platelets 163 10*3/mm3      Neutrophil % 71.0 %      Lymphocyte % 18.5 %      Monocyte % 7.2 %      Eosinophil % 2.8 %      Basophil % 0.5 %      Neutrophils, Absolute 5.40 10*3/mm3      Lymphocytes, Absolute 1.40 10*3/mm3      Monocytes, Absolute 0.60 10*3/mm3      Eosinophils, Absolute 0.20 10*3/mm3      Basophils, Absolute 0.00 10*3/mm3      nRBC 0.0 /100 WBC     HIV-1 Antibody, EIA [[id number]] Resulted: 05/14/21    Specimen: Blood Updated: 12/19/21 0832     External HIV Antibody Negative    Hepatitis B Surface Antigen [[id number]] Resulted: 05/14/21    Specimen: Blood Updated: 12/19/21 0832     External Hepatitis B Surface Ag Negative    RPR [[id number]] Resulted: 05/14/21    Specimen: Blood Updated: 12/19/21 0832     External RPR Non-Reactive    Rubella Antibody, IgG [[id number]] Resulted: 05/14/21    Specimen: Blood Updated: 12/19/21 0832     External Rubella Qual Immune    Group B Streptococcus Culture - Swab, Vaginal/Rectum [[id number]] Resulted: 11/22/21    Specimen: Swab from Vaginal/Rectum Updated: 12/19/21 0830     External Strep Group B Ag POS    T Pallidum Antibody (FTA-Ab) [[id number]] Collected: 12/19/21 0825    Specimen: Blood Updated: 12/19/21 0826             Assessment/Plan     Active Problems:    Encounter for induction of labor         Assessment:  1.  Intrauterine pregnancy at 40w0d gestation  with reactive fetal status.    2.   IOL at term  3.  GBS status:   External Strep Group B Ag   Date Value Ref Range Status   2021 POS  Final     4. FSR    Plan:  1. Vaginal anticipated  2. PCN G for GBS prophylaxis       Clair Dawson MD   2021   00:23 EST      Electronically signed by Clair Dawson MD at 21 0028     H&P signed by [employer] On[employer], [place] at 21 0814      Scan on 2021 by [employer], [place]: [facility], 2021-2021          Electronically signed by [employer] On[employer], [place] at 21 0814          Operative/Procedure Notes (last 48 hours)      Clair Dawson MD at 21 0326          [facility]   Section Operative Note    Pre-Operative Dx:   1.  Patient is a 26 y.o.  at 40w0d    2. fetal distress      Post-Operative Dx:    1.  Same     Procedure: Primary    Surgeon:  Assistant: Clair Dawson MD   Anesthesia:  Anesthesiologist:  Epidural  Dr Boothe     EBL:  800cc     Antibiotics: cefazolin (Ancef)     Infant    Findings: LVFI  AB.314/0.1  VB.343/-0.4       Apgars:    8 @ 1 minute /   9 @ 5 minutes          Procedure Details:     Pt was taken to the OR where she was prepped and draped in the usual sterile fashion with a catheter and a left tilt.  Anesthesia was found to be adequate.    A Pfannenstiel skin incision was made with the scalpel and carried through to the underlying layer of fascia with the scalpel.  The fascial incision was extended laterally with the Alonso scissors and  from the underlying rectus muscles superiorly and inferiorly.  The rectus muscles were  in the midline and the peritoneum was entered bluntly.  The peritoneal incision was extended laterally and the Chris retractor was placed.  The bladder flap was created sharply.    A low transverse uterine incision was made with the scalpel and extended manually.    The infant's head, shoulders, and body  were delivered without difficulty.  Nose and mouth were bulb suctioned.  The cord was clamped and cut.  The infant was shown to the parents then handed to awaiting nurse with good cry, color, tone, and movement of all extremities.   Cord gasses and blood were obtained.   Placenta was delivered spontaneously intact with a three vessel cord.    The uterus was exteriorized and cleared of all clots and debris.    The uterine incision was repaired with 0 Monocryl in a running, locked fashion and excellent hemostasis was achieved.   The posterior cul de sac was irrigated.   The uterus was returned to the abdomen, the gutters were cleared of all clots and debris.  The uterine incision was examined and was hemostatic.     The peritoneum was reapproximated with 3.0 Monocryl in a running fashion.  The rectus muscles were reapproximated with 0 Monocryl in several simple interrupted sutures.    The fascia was closed with 0 Vicryl in a running fashion.    The subcutaneous space was irrigated and closed with 3.0 Monocryl.    The skin was closed with staples.  Sponge, lap, needle and instrument counts were correct x 2.        Complications:   None      Disposition:   Recovery room then postpartum.          Clair Dawson MD  12/20/2021  03:27 EST        Electronically signed by Clair Dawson MD at 12/20/21 4123

## 2021-12-21 LAB
BASOPHILS # BLD AUTO: 0 10*3/MM3 (ref 0–0.2)
BASOPHILS NFR BLD AUTO: 0.2 % (ref 0–1.5)
DEPRECATED RDW RBC AUTO: 45.5 FL (ref 37–54)
EOSINOPHIL # BLD AUTO: 0.1 10*3/MM3 (ref 0–0.4)
EOSINOPHIL NFR BLD AUTO: 1.2 % (ref 0.3–6.2)
ERYTHROCYTE [DISTWIDTH] IN BLOOD BY AUTOMATED COUNT: 14 % (ref 12.3–15.4)
HCT VFR BLD AUTO: 22.8 % (ref 34–46.6)
HGB BLD-MCNC: 7.9 G/DL (ref 12–15.9)
LAB AP CASE REPORT: NORMAL
LYMPHOCYTES # BLD AUTO: 1 10*3/MM3 (ref 0.7–3.1)
LYMPHOCYTES NFR BLD AUTO: 10.5 % (ref 19.6–45.3)
MCH RBC QN AUTO: 32.6 PG (ref 26.6–33)
MCHC RBC AUTO-ENTMCNC: 34.8 G/DL (ref 31.5–35.7)
MCV RBC AUTO: 93.6 FL (ref 79–97)
MONOCYTES # BLD AUTO: 0.6 10*3/MM3 (ref 0.1–0.9)
MONOCYTES NFR BLD AUTO: 6.1 % (ref 5–12)
NEUTROPHILS NFR BLD AUTO: 7.7 10*3/MM3 (ref 1.7–7)
NEUTROPHILS NFR BLD AUTO: 82 % (ref 42.7–76)
NRBC BLD AUTO-RTO: 0 /100 WBC (ref 0–0.2)
PATH REPORT.FINAL DX SPEC: NORMAL
PATH REPORT.GROSS SPEC: NORMAL
PLATELET # BLD AUTO: 171 10*3/MM3 (ref 140–450)
PMV BLD AUTO: 8.7 FL (ref 6–12)
RBC # BLD AUTO: 2.43 10*6/MM3 (ref 3.77–5.28)
T PALLIDUM AB SER QL IF: NON REACTIVE
WBC NRBC COR # BLD: 9.4 10*3/MM3 (ref 3.4–10.8)

## 2021-12-21 PROCEDURE — 85025 COMPLETE CBC W/AUTO DIFF WBC: CPT | Performed by: OBSTETRICS & GYNECOLOGY

## 2021-12-21 PROCEDURE — 25010000002 KETOROLAC TROMETHAMINE PER 15 MG: Performed by: ANESTHESIOLOGY

## 2021-12-21 RX ORDER — ACETAMINOPHEN 325 MG/1
650 TABLET ORAL EVERY 6 HOURS PRN
Status: DISCONTINUED | OUTPATIENT
Start: 2021-12-21 | End: 2021-12-22 | Stop reason: HOSPADM

## 2021-12-21 RX ADMIN — IBUPROFEN 600 MG: 600 TABLET ORAL at 21:07

## 2021-12-21 RX ADMIN — DOCUSATE SODIUM 100 MG: 100 CAPSULE, LIQUID FILLED ORAL at 21:21

## 2021-12-21 RX ADMIN — IBUPROFEN 600 MG: 600 TABLET ORAL at 15:58

## 2021-12-21 RX ADMIN — ACETAMINOPHEN 650 MG: 325 TABLET, FILM COATED ORAL at 01:02

## 2021-12-21 RX ADMIN — IBUPROFEN 600 MG: 600 TABLET ORAL at 09:54

## 2021-12-21 RX ADMIN — KETOROLAC TROMETHAMINE 30 MG: 30 INJECTION, SOLUTION INTRAMUSCULAR; INTRAVENOUS at 03:48

## 2021-12-21 RX ADMIN — ACETAMINOPHEN 650 MG: 325 TABLET, FILM COATED ORAL at 13:54

## 2021-12-21 NOTE — LACTATION NOTE
This note was copied from a baby's chart.  Pt visited, holding baby in her arms, reports recently fed baby, breastfeeding continues improving. Denies lactation questions or needs. Encouraged frequent skin to skin and feed on demand. Will call for help as needed.

## 2021-12-21 NOTE — PLAN OF CARE
Problem: Adult Inpatient Plan of Care  Goal: Plan of Care Review  Outcome: Ongoing, Progressing  Flowsheets (Taken 12/21/2021 0331)  Progress: improving  Plan of Care Reviewed With: patient  Outcome Summary: Pt rested well throughout shift. C/o pain managed through medication. Pt able to stand appropriately and ambulate to bathroom successfully. No other complaints at this time. Will continue to monitor.  Goal: Patient-Specific Goal (Individualized)  Outcome: Ongoing, Progressing  Goal: Absence of Hospital-Acquired Illness or Injury  Outcome: Ongoing, Progressing  Intervention: Identify and Manage Fall Risk  Recent Flowsheet Documentation  Taken 12/21/2021 0102 by Jennifer Luna RN  Safety Promotion/Fall Prevention: safety round/check completed  Taken 12/20/2021 2300 by Jennifer Luna RN  Safety Promotion/Fall Prevention: safety round/check completed  Taken 12/20/2021 2246 by Jennifer Luna RN  Safety Promotion/Fall Prevention: safety round/check completed  Taken 12/20/2021 2212 by Jennifer Luna RN  Safety Promotion/Fall Prevention: safety round/check completed  Taken 12/20/2021 2120 by Jennifer Luna RN  Safety Promotion/Fall Prevention: safety round/check completed  Taken 12/20/2021 2015 by Jennifer Luna RN  Safety Promotion/Fall Prevention: safety round/check completed  Taken 12/20/2021 1919 by Jennifer Luna RN  Safety Promotion/Fall Prevention:   activity supervised   assistive device/personal items within reach   clutter free environment maintained   safety round/check completed  Taken 12/20/2021 1900 by Jennifer Luna RN  Safety Promotion/Fall Prevention: safety round/check completed  Intervention: Prevent Skin Injury  Recent Flowsheet Documentation  Taken 12/20/2021 1919 by Jennifer Luna RN  Body Position: position changed independently  Intervention: Prevent and Manage VTE (venous thromboembolism) Risk  Recent Flowsheet Documentation  Taken 12/20/2021 1919 by  Luna, Jennifer, RN  VTE Prevention/Management: (encouraged to ambulate)   dorsiflexion/plantar flexion performed   sequential compression devices off  Intervention: Prevent Infection  Recent Flowsheet Documentation  Taken 2021 by Jennifer Luna RN  Infection Prevention:   visitors restricted/screened   single patient room provided   rest/sleep promoted   personal protective equipment utilized  Goal: Optimal Comfort and Wellbeing  Outcome: Ongoing, Progressing  Intervention: Provide Person-Centered Care  Recent Flowsheet Documentation  Taken 2021 by Jennifer Luna RN  Trust Relationship/Rapport:   care explained   choices provided   thoughts/feelings acknowledged  Goal: Readiness for Transition of Care  Outcome: Ongoing, Progressing     Problem: Adjustment to Role Transition (Postpartum  Delivery)  Goal: Successful Maternal Role Transition  Outcome: Ongoing, Progressing  Intervention: Support Maternal Role Transition  Recent Flowsheet Documentation  Taken 2021 by Jennifer Luna RN  Supportive Measures:   active listening utilized   counseling provided  Parent/Child Attachment Promotion: skin-to-skin contact encouraged     Problem: Bleeding (Postpartum  Delivery)  Goal: Hemostasis  Outcome: Ongoing, Progressing  Intervention: Monitor and Manage Postpartum Bleeding  Recent Flowsheet Documentation  Taken 2021 by Jennifer Luna RN  Syncope Management: position changed slowly     Problem: Infection (Postpartum  Delivery)  Goal: Absence of Infection Signs and Symptoms  Outcome: Ongoing, Progressing  Intervention: Prevent or Manage Infection  Recent Flowsheet Documentation  Taken 2021 by Jennifer Luna RN  Infection Management: aseptic technique maintained     Problem: Pain (Postpartum  Delivery)  Goal: Acceptable Pain Control  Outcome: Ongoing, Progressing     Problem: Postoperative Nausea and Vomiting (Postpartum   Delivery)  Goal: Nausea and Vomiting Relief  Outcome: Ongoing, Progressing  Intervention: Prevent or Manage Postoperative Nausea and Vomiting  Recent Flowsheet Documentation  Taken 2021 191 by Jennifer Luna RN  Nausea/Vomiting Interventions:   slow deep breathing encouraged   sips clear liquids given     Problem: Postoperative Urinary Retention (Postpartum  Delivery)  Goal: Effective Urinary Elimination  Outcome: Ongoing, Progressing   Goal Outcome Evaluation:  Plan of Care Reviewed With: patient        Progress: improving  Outcome Summary: Pt rested well throughout shift. C/o pain managed through medication. Pt able to stand appropriately and ambulate to bathroom successfully. No other complaints at this time. Will continue to monitor.

## 2021-12-21 NOTE — PLAN OF CARE
Goal Outcome Evaluation: Pt doing well pain controlled with motrin, breastfeeding well.

## 2021-12-21 NOTE — PROGRESS NOTES
MARLENE Zhao  Postpartum Note    Subjective   Postpartum Day 1:  Primary Low Transverse  Section    Patient without complaints. Her pain is well controlled with nonsteroidal anti-inflammatory drugs. She is ambulating well.  Patient describes her bleeding as thin lochia. Mackenzie PO intake. +flatus. No dizziness or fatigue.    Breastfeeding: infant latching without difficulty.    Objective     Vitals:  Vitals:    21 1540 21 1922 21 2251 21 0300   BP: 101/68 106/70 104/67 106/68   BP Location: Right arm Right arm Right arm Right arm   Patient Position: Lying Lying Lying Lying   Pulse: 80 98 84 90   Resp: 16 17 15 16   Temp: 97.6 °F (36.4 °C) 97.4 °F (36.3 °C) 98.1 °F (36.7 °C) 97.7 °F (36.5 °C)   TempSrc: Oral Oral Oral Oral   SpO2: 98% 98% 98% 98%   Weight:       Height:           Physical Exam:  General:  Alert and oriented x3. No acute distress.  Abdomen: abdomen is soft without significant tenderness, masses, organomegaly or guarding. Fundus: appropriate, firm, non tender  Incision: Clean/Dry/Intact and Staples intact  Skin: Warm, Dry  Extremities: Normal,  trace edema. Nontender     Labs:  Results from last 7 days   Lab Units 21  0825   WBC 10*3/mm3 7.60   HEMOGLOBIN g/dL 13.6   HEMATOCRIT % 39.9   PLATELETS 10*3/mm3 163            Feeding method: Breastfeeding Status: Yes     Blood Type: RH Positive        Assessment/Plan     Active Problems:    Encounter for induction of labor      Salma Terrell is Day 1  post-partum from a  Primary Low Transverse  Section      Plan:  routine, continue present management, encourage ambulation and monitor pain.   Post delivery CBC pending    Rubi Gooden NP  2021  08:20 EST

## 2021-12-22 VITALS
BODY MASS INDEX: 27.22 KG/M2 | RESPIRATION RATE: 18 BRPM | DIASTOLIC BLOOD PRESSURE: 77 MMHG | SYSTOLIC BLOOD PRESSURE: 116 MMHG | HEIGHT: 65 IN | HEART RATE: 95 BPM | WEIGHT: 163.36 LBS | TEMPERATURE: 97.7 F | OXYGEN SATURATION: 98 %

## 2021-12-22 PROBLEM — Z34.90 ENCOUNTER FOR INDUCTION OF LABOR: Status: RESOLVED | Noted: 2021-12-19 | Resolved: 2021-12-22

## 2021-12-22 RX ORDER — FERROUS SULFATE TAB EC 324 MG (65 MG FE EQUIVALENT) 324 (65 FE) MG
324 TABLET DELAYED RESPONSE ORAL 2 TIMES DAILY WITH MEALS
Qty: 60 TABLET | Refills: 1 | Status: SHIPPED | OUTPATIENT
Start: 2021-12-22

## 2021-12-22 RX ORDER — OXYCODONE HYDROCHLORIDE AND ACETAMINOPHEN 5; 325 MG/1; MG/1
1 TABLET ORAL EVERY 4 HOURS PRN
Qty: 20 TABLET | Refills: 0 | Status: SHIPPED | OUTPATIENT
Start: 2021-12-22

## 2021-12-22 RX ORDER — PSEUDOEPHEDRINE HCL 30 MG
100 TABLET ORAL 2 TIMES DAILY PRN
Qty: 30 CAPSULE | Refills: 1 | Status: SHIPPED | OUTPATIENT
Start: 2021-12-22

## 2021-12-22 RX ORDER — FERROUS SULFATE TAB EC 324 MG (65 MG FE EQUIVALENT) 324 (65 FE) MG
324 TABLET DELAYED RESPONSE ORAL 2 TIMES DAILY WITH MEALS
Status: DISCONTINUED | OUTPATIENT
Start: 2021-12-22 | End: 2021-12-22 | Stop reason: HOSPADM

## 2021-12-22 RX ORDER — LANOLIN 100 %
OINTMENT (GRAM) TOPICAL
Qty: 30 G | Refills: 1 | Status: SHIPPED | OUTPATIENT
Start: 2021-12-22

## 2021-12-22 RX ADMIN — FERROUS SULFATE TAB EC 324 MG (65 MG FE EQUIVALENT) 324 MG: 324 (65 FE) TABLET DELAYED RESPONSE at 09:09

## 2021-12-22 RX ADMIN — IBUPROFEN 600 MG: 600 TABLET ORAL at 02:39

## 2021-12-22 RX ADMIN — IBUPROFEN 600 MG: 600 TABLET ORAL at 09:09

## 2021-12-22 RX ADMIN — DOCUSATE SODIUM 100 MG: 100 CAPSULE, LIQUID FILLED ORAL at 09:09

## 2021-12-22 RX ADMIN — ACETAMINOPHEN 650 MG: 325 TABLET, FILM COATED ORAL at 04:58

## 2021-12-22 NOTE — PLAN OF CARE
Goal Outcome Evaluation:  Plan of Care Reviewed With: patient        Progress: improving   Breastfeeding infant, tolerating regular diet, up ad carmen.  Minimal vaginal bleeding

## 2021-12-22 NOTE — DISCHARGE SUMMARY
[facility]  Delivery Discharge Summary    Primary OB Clinician: Clair Dawson MD    Admission Diagnosis:  Active Problems:    * No active hospital problems. *  40wk IUP  Fetal distress    Discharge Diagnosis:  Same, delivered    Gestational Age: 40w0d    Date of Delivery: 2021     Delivered By:  Clair Dawson     Delivery Type: , Low Transverse      Tubal Ligation: n/a    Intrapartum Course: Uncomplicated delivery.     Postpartum Course:  Pt was admitted and underwent  Primary Low Transverse  Section. Pt was transferred to PP where she had an uncomplicated course. Pt remained AFVSS, had scant lochia and pain was well controlled. Pt d/c home in stable condition and will f/u in office for PP visit as scheduled or PRN. Currently breastfeeding. Plans on condoms  for contraception. Pt is anemic. Pt states she feels well, denies dizziness, SOA, or palpitations. Pt desires d/c on POD2.     Physical Exam:    Vitals:   Vitals:    21 0300 21 0835 21 1500 21 2236   BP: 106/68 124/85 118/79 136/82   BP Location: Right arm Right arm Right arm Left arm   Patient Position: Lying Sitting Sitting Sitting   Pulse: 90 88 104 103   Resp: 16 18 16 16   Temp: 97.7 °F (36.5 °C) 97.8 °F (36.6 °C) 98 °F (36.7 °C) 97.4 °F (36.3 °C)   TempSrc: Oral Oral Oral Oral   SpO2: 98% 100% 98% 97%   Weight:       Height:         Temp (24hrs), Av.7 °F (36.5 °C), Min:97.4 °F (36.3 °C), Max:98 °F (36.7 °C)      General Appearance:    Alert, cooperative, in no acute distress   Abdomen:     Soft non-tender, non-distended, no guarding, no rebound         tenderness.   Extremities:   Moves all extremities well, no edema, no cyanosis, no              Redness.   Incision:   Clean, dry, intact. Staples   Fundus:   Firm, below umbilicus     Feeding method: Breastfeeding Status: Yes    Labs:  Results from last 7 days   Lab Units 21  1304 21  0825   WBC 10*3/mm3 9.40 7.60   HEMOGLOBIN g/dL  7.9* 13.6   HEMATOCRIT % 22.8* 39.9   PLATELETS 10*3/mm3 171 163           Blood Type: RH Positive      Plan:  Discharge to home.    Continue FeSO4 BID  Follow-up appointment with Dr Dawson in 6 weeks and in office tomorrow for staple removal.    Tatyana Marie, APRN  12/22/2021  10:21 EST      /d

## 2021-12-23 NOTE — ANESTHESIA POSTPROCEDURE EVALUATION
Patient: Salma Terrell    Procedure Summary     Date: 21 Room / Location: [facility] LABOR DELIVERY  [facility] LABOR DELIVERY    Anesthesia Start:  Anesthesia Stop: 21    Procedure:  SECTION PRIMARY (N/A Abdomen) Diagnosis: (Fetal Intolerance of Labor)    Surgeons: Clair Dawson MD Provider: Thomas Boothe MD    Anesthesia Type: CSE ASA Status: 2          Anesthesia Type: CSE    Vitals  Vitals Value Taken Time   /77 21 1100   Temp 97.7 °F (36.5 °C) 21 1100   Pulse 95 21 1100   Resp 18 21 1100   SpO2 98 % 21 1100           Post Anesthesia Care and Evaluation    Patient location during evaluation: PACU  Patient participation: complete - patient participated  Level of consciousness: awake  Pain scale: See nurse's notes for pain score.  Pain management: adequate  Airway patency: patent  Anesthetic complications: No anesthetic complications  PONV Status: none  Cardiovascular status: acceptable  Respiratory status: acceptable  Hydration status: acceptable  Post Neuraxial Block status: Motor and sensory function returned to baseline and No signs or symptoms of PDPH  Comments: Patient seen and examined postoperatively; vital signs stable; SpO2 greater than or equal to 90%; cardiopulmonary status stable; nausea/vomiting adequately controlled; pain adequately controlled; no apparent anesthesia complications; patient discharged from anesthesia care when discharge criteria were met

## 2021-12-23 NOTE — SIGNIFICANT NOTE
Case Management Discharge Note                Selected Continued Care - Discharged on 12/22/2021 Admission date: 12/19/2021 - Discharge disposition: Home or Self Care                   Final Discharge Disposition Code: (P) 01 - home or self-care

## 2021-12-23 NOTE — PAYOR COMM NOTE
"This is discharge notice for Salma Terrell   Reference/Auth # [id number]  Pt discharged routine to home on 12/22/21 .    Sera Ritchie, RN, BSN  Utilization Review Nurse  [facility]  Direct & confidential phone # [phone]  Fax # [phone]      Salma Terrell (26 y.o. Female)             Date of Birth Social Security Number Address Home Phone MRN    [id number]  [id number] [id number] [phone] [id number]    [employer] Marital Status             Unknown        Admission Date Admission Type Admitting Provider Attending Provider Department, Room/Bed    12/19/21 Elective Clair Dawson MD  [facility] MOTHER BABY, M420/1    Discharge Date Discharge Disposition Discharge Destination          12/22/2021 Home or Self Care              Attending Provider: (none)   Allergies: No Known Allergies    Isolation: None   Infection: None   Code Status: Prior   Advance Care Planning Activity    Ht: 165.1 cm (65\")   Wt: 74.1 kg (163 lb 5.8 oz)    Admission Cmt: None   Principal Problem: None                Active Insurance as of 12/19/2021     Primary Coverage     Payor Plan Insurance Group Employer/Plan Group    [employer] BLUE [employer]OSS [employer] BLUE CROSS BLUE [employer]IELD PPO [id number]     Payor Plan Address Payor Plan Phone Number Payor Plan Fax Number Effective Dates    [address] [phone]  11/19/2017 - None Entered    [place] [address]       Subscriber Name Subscriber Birth Date Member ID       TRUNG TERRELL [birth year]/20/1987 [id number]                 Emergency Contacts      (Rel.) Home Phone Work Phone Mobile Phone    TRUNG TERRELL (Spouse) [phone] -- --               Discharge Summary      Tatyana Marie, APRN at 12/22/21 1021          [facility]  Delivery Discharge Summary    Primary OB Clinician: Clair Dawson MD    Admission Diagnosis:  Active Problems:    * No active hospital problems. *  40wk IUP  Fetal distress    Discharge " Diagnosis:  Same, delivered    Gestational Age: 40w0d    Date of Delivery: 2021     Delivered By:  Clair Dawson     Delivery Type: , Low Transverse      Tubal Ligation: n/a    Intrapartum Course: Uncomplicated delivery.     Postpartum Course:  Pt was admitted and underwent  Primary Low Transverse  Section. Pt was transferred to PP where she had an uncomplicated course. Pt remained AFVSS, had scant lochia and pain was well controlled. Pt d/c home in stable condition and will f/u in office for PP visit as scheduled or PRN. Currently breastfeeding. Plans on condoms  for contraception. Pt is anemic. Pt states she feels well, denies dizziness, SOA, or palpitations. Pt desires d/c on POD2.     Physical Exam:    Vitals:   Vitals:    21 0300 21 0835 21 1500 21 2236   BP: 106/68 124/85 118/79 136/82   BP Location: Right arm Right arm Right arm Left arm   Patient Position: Lying Sitting Sitting Sitting   Pulse: 90 88 104 103   Resp: 16 18 16 16   Temp: 97.7 °F (36.5 °C) 97.8 °F (36.6 °C) 98 °F (36.7 °C) 97.4 °F (36.3 °C)   TempSrc: Oral Oral Oral Oral   SpO2: 98% 100% 98% 97%   Weight:       Height:         Temp (24hrs), Av.7 °F (36.5 °C), Min:97.4 °F (36.3 °C), Max:98 °F (36.7 °C)      General Appearance:    Alert, cooperative, in no acute distress   Abdomen:     Soft non-tender, non-distended, no guarding, no rebound         tenderness.   Extremities:   Moves all extremities well, no edema, no cyanosis, no              Redness.   Incision:   Clean, dry, intact. Staples   Fundus:   Firm, below umbilicus     Feeding method: Breastfeeding Status: Yes    Labs:  Results from last 7 days   Lab Units 21  1304 21  0825   WBC 10*3/mm3 9.40 7.60   HEMOGLOBIN g/dL 7.9* 13.6   HEMATOCRIT % 22.8* 39.9   PLATELETS 10*3/mm3 171 163           Blood Type: RH Positive      Plan:  Discharge to home.    Continue FeSO4 BID  Follow-up appointment with Dr Dawson in 6 weeks and  in office tomorrow for staple removal.    JAZ Frey  12/22/2021  10:21 EST      /d    Electronically signed by Tatyana Marie, JAZ at 12/22/21 1020

## 2022-10-24 ENCOUNTER — E-VISIT (OUTPATIENT)
Dept: FAMILY MEDICINE CLINIC | Facility: TELEHEALTH | Age: 27
End: 2022-10-24
Payer: COMMERCIAL

## 2022-10-24 PROCEDURE — BRIGHTMDVISIT: Performed by: NURSE PRACTITIONER

## 2022-10-24 NOTE — E-VISIT TREATED
Chief Complaint: Eye pain, eye bump, or irritation   Patient introduction   Patient is 27-year-old female with redness, drainage, itchiness, a burning/star/gritty feeling, and eyelid crusting in both eyes for less than 1 day.   Patient-submitted comments   Patient writes: Everyone in my household including my baby girl has cold symptoms. I've had pink eye many times before and this is how it starts before it gets much worse. I want to get it under control before I give it to her. Thanks! .   Patient did not request an excuse note.   General presentation   Eye redness described as small blood vessels spread throughout the white part of the eye.   Eye drainage is green and thick. When patient wakes up in the morning, eyes are crusty but not stuck shut.   Associated symptoms include:    Upper respiratory symptoms including cough, stuffy nose, and sore throat.   None of the following symptoms: - Fever - Swelling, redness, or warmth of skin around the eye(s) - Changes in vision - Foreign body sensation - Light sensitivity - Headache    Flaking or dry skin on eyelids   Patient does not wear contact lenses.   No known conjunctivitis exposure in the last 2 weeks. History of conjunctivitis in the past. Current symptoms feel exactly like previous episodes of conjunctivitis. No use of antibiotics to treat conjunctivitis within the last month.   Symptoms did not start shortly after a change in environment. History of seasonal and/or pet allergies. No history of developing similar eye symptoms at the same time of year. Current symptoms do not feel allergy-related.   Has never used eye drops for eye allergy symptoms.   No history of asthma, allergic rhinitis, or eczema.   No history of blepharitis or seborrheic dermatitis.   Has not used non-prescription eye drops or eye wash for current symptoms.   Review of red flags/alarm symptoms:    No serious vision changes    Not feeling as if something is in the eye or sensitivity to  bright lights    No recent eye injury and affected eye is bulging out    No signs/symptoms suggesting angle-closure glaucoma (cloudiness or dullness of the iris along with moderate to severe headache)    No history of glaucoma in the past 3 months    No eye surgery in the past 3 months    No severe headache    No open sores or blisters on eyelids, nose, scalp, forehead, or around their eyes    No white or grayish sore on the eye(s)    Did not get irritants in their eyes within the past week    No treatment for any eye conditions in past 4 weeks    No exposure to an STI during the 12 hours prior to symptom onset    No eye redness that looks like small blood vessels, with the redness worse around the iris   Pregnancy/menstrual status/breastfeeding:   Not pregnant. Patient is breastfeeding. Regarding last menstrual period, patient writes: October 15 2022.   Self-exam: - No pain with eye movements   Current medications   Currently taking PRENATAL 1 PO.   Medication allergies   No known drug allergies.   Medication contraindication review   None.   Past medical history   No immunocompromising conditions. No history of cancer.   Assessment   Bacterial conjunctivitis   This is the likely diagnosis based on supporting interview responses.   Plan   Medications:    tobramycin 0.3 % eye drops RX 0.3% 2 gtt in affected eye q4h 7d for infection. This medication is an antibiotic. Take it exactly as directed. You must complete the 7-day course of medication, even if you feel better after the first few days of treatment. Amount is 5 mL.   The patient's prescription will be sent to:   [id number]   [address] [place] [facility]   Phone: [phone]     Fax: [phone]   Education:    Condition and causes    Prevention    Treatment and self-care    When to call provider   Additional Notes   bilateral conjunctival redness with prominent blood vessels observed.   ----------   Electronically signed by Jeana Bailey  JAZ on 2022-10-24 at 09:30AM   ----------   Patient Interview Transcript:   Which of these symptoms are bothering you? Select all that apply.    Redness in the whites of the eye(s)    Eye drainage (such as excess tears, mucus, or pus)    Crusting of the eyelids or eyelashes   Not selected:    Eye dryness    Eyelid swelling    Eyelid redness    Bump or pimple on or inside the eyelid    None of the above   Do you have any of these eye symptoms? Select all that apply.    Itchiness    Burning, star, or gritty feeling   Not selected:    Pain inside the eyeball    Eyelid pain    Flaking or dry skin on the eyelid    None of the above   How long have you had your eye symptoms? Select one.    Less than 1 day   Not selected:    1 to 3 days    4 to 7 days    More than 7 days   Which eye is affected? Select one.    Both eyes   Not selected:    My left eye    My right eye    It started in one eye, but now both eyes are affected   Does it feel like there's something in your eye that's making it hard to open your eye or keep it open? Select one.    No   Not selected:    Yes   Is the skin around your eye red, swollen, or warm to the touch? Pay special attention to the area above your eyelid, the upper cheek, forehead, and the top part of the nose (between the eyes). Select one.    No   Not selected:    Yes   How would you describe your eye redness? Select one.    A. It looks like small blood vessels spread throughout the white part of the eye   Not selected:    B. It looks like small blood vessels, but the redness is much worse around the colored part of the eye    C. There's a spot of red in just one part of the white area    D. None of the above   What color is your eye drainage or crust? Select one.    Green   Not selected:    Clear    White    Yellow   How would you describe your eye drainage? Select one.    Thick   Not selected:    Thin (watery, like normal tears)    Stringy    None of the above   How would you describe your  "eyes when you wake up in the morning? Select one.    My eyelashes and eyelids are crusty and gooey, but they're not stuck shut   Not selected:    My eyes are \"stuck shut\"    None of the above   Is there pain or increased pain when you move your eye(s)? To test this, focus on an object in the distance with both eyes. Now look to the left, the right, above, and below that object without moving your head. Select one.    No   Not selected:    Yes   In the past 3 months, have you been diagnosed with glaucoma? Select one.    No   Not selected:    Yes   Does your eye look cloudy, causing the colored part of the eye to appear dull?    No   Not selected:    Yes   Do you have any open sores or blisters around your eyes or on your eyelids, nose, scalp, or forehead? Select one.    No   Not selected:    Yes   Do you have a white or grayish sore on your eye?    No   Not selected:    Yes   Eye conditions may be more serious when certain factors are present. Have you had any vision changes? Select one.    No   Not selected:    Yes, my vision is a little blurry, but it clears when I wipe my eyes    Yes, I'm seeing double    Yes, I can't see anything at all   Are you sensitive to bright lights? For example, do you need to shade your eyes by wearing a hat or sunglasses? Do you find yourself holding up your hand to block out light? Do you keep your head down and turned away from lights, lamps, or windows? Select one.    No   Not selected:    Yes   Ready to send photos? If you choose not to send photos, you may need to speak with a provider to get care. Select one.    OK, I'll send photos   Not selected:    No, I'd rather not send photos   Send up to three photos for review. - Take the photos in a room with good lighting. Don't use a flash. - Make sure the photos are in focus. Photo quality is important, so keep snapping until you're happy with the images. - Take one close-up photo showing both eyes together.    Upload 1   Not " selected:    Upload 2    Upload 3   Do your symptoms include a headache? Select one.    No   Not selected:    Yes, a mild headache    Yes, a moderate headache; it gets in the way of my daily activities    Yes, a severe headache; it stops me from doing my daily activities    Yes, the worst headache of my life   Along with your eye symptoms, have you had a fever or felt hot? Select one.    No   Not selected:    Yes   Do you currently have any of these symptoms? Select all that apply.    Cough    Stuffy nose    Sore throat   Not selected:    Runny nose    None of the above   In addition to your eye symptoms, have you developed any of these? Select all that apply.    None of the above   Not selected:    Muscle aches    Skin rash   Have you recently injured your eye(s)? Injuries include being scratched, hit, or poked in the eye. Select one.    No   Not selected:    Yes   In the past week, have you gotten any irritants in your eye(s)? Irritants include things such as chemicals and cleaning supplies. Select one.    No   Not selected:    Yes   Have you been around someone with pink eye (conjunctivitis) in the last 2 weeks? Select one.    No, not that I know of   Not selected:    Yes   Do you have any seasonal or pet allergies? Select one.    Yes   Not selected:    No, not that I know of   Do your current symptoms feel like allergy symptoms? Select one.    No   Not selected:    Yes   Do you often develop similar symptoms during the same time or season of the year? Select one.    No   Not selected:    Yes   Did your symptoms begin shortly after a change in your environment? This might include moving to a new home, being exposed to a new pet, or traveling to a new place. Select one.    No   Not selected:    Yes   Some eye symptoms can be caused by a sexually transmitted infection (STI). Is it possible you were exposed to an STI during the 12 hours before your symptoms began? Select one.    No, not that I know of   Not  selected:    Yes   Have you ever had pink eye in the past? Select one.    Yes   Not selected:    No   How much do your current symptoms feel like past cases of pink eye? Select one.    Exactly the same   Not selected:    Mostly the same    Somewhat the same    Completely different   Have you had eye surgery in the past 3 months? Select one.    No   Not selected:    Yes   Have you been seen or treated for any eye conditions in the past 4 weeks? Select one.    No   Not selected:    Yes   Do you wear contact lenses? Select one.    No   Not selected:    Yes   Do you have a history of any of these? Select all that apply.    None of the above   Not selected:    Asthma    Hay fever (allergic rhinitis)    Eczema   Do you have (or have you had in the past) any of these conditions? Select all that apply.    None of the above   Not selected:    Blepharitis (eyelid inflammation)    Seborrheic dermatitis (dandruff)   Have you recently been treated for any of these? Select all that apply.    None of the above   Not selected:    Aspirin- or NSAID-induced asthma or urticaria (hives)    Aspirin triad, Samter's triad, or aspirin-exacerbated respiratory disease (AERD)    Coronary artery bypass graft (CABG) surgery    Fungal eye infection    Scratched cornea, or corneal abrasion    Tuberculosis infection of the eye    Viral eye infection   Do you have any of these conditions that can affect the immune system? Scroll to see all options. Select all that apply.    None of these   Not selected:    History of bone marrow transplant    Chronic kidney disease    Chronic liver disease (including cirrhosis)    HIV/AIDS    Inflammatory bowel disease (Crohn's disease or ulcerative colitis)    Lupus    Moderate to severe plaque psoriasis    Multiple sclerosis    Rheumatoid arthritis    Sickle cell anemia    Alpha or beta thalassemia    History of solid organ transplant (kidney, liver, or heart)    History of spleen removal    An autoimmune  disorder not listed here    A condition requiring treatment with long-term use of oral steroids (such as prednisone, prednisolone, or dexamethasone)   Have you ever been diagnosed with cancer? Select one.    No   Not selected:    Yes, I have cancer now    Yes, but I'm in remission   Are you pregnant? Select one.    No   Not selected:    Yes   When was your last menstrual period? If you don't currently have periods or no longer have periods, please briefly explain.    October 15 2022   Are you breastfeeding? Select one.    Yes   Not selected:    No   Have you used antibiotic eye drops for pink eye in the past month? Select one.    No   Not selected:    Yes   In the past, which eye drops worked well to control your eye allergy symptoms? Select all that apply.    I haven't used eye drops for my allergies   Not selected:    Nothing has worked    Olopatadine (Pataday, Patanol)    Alcaftadine (Lastacaft)    Bepotastine (Bepreve)    Cetirizine (Zerviate)    Epinastine    Ketotifen (Alaway, Zaditor)    Azelastine (Optivar)    Naphazoline/Pheniramine (Naphcon-A, Opcon-A)    Cromolyn sodium    Nedocromil (Alocril)    Lodoxamide (Alomide)    Other (specify)   Have you used any of these non-prescription eye drops or an eye wash for your current symptoms? Select all that apply.    No   Not selected:    Eye drops for itchy eyes (such as Naphcon A, Opcon-A, or Visine-A)    Eye drops for red eyes (such as Advanced Eye Relief Redness, Clear Eyes Redness Relief)    Eye drops for dry eyes (such as Artificial Tears, Advanced Eye Relief Dry Eyes, Refresh Tears, or Systane Ultra)    Eye drops for allergies (such as Zaditor)    Eye wash    Other (specify)   Are you still taking these medications listed in your medical record? If you're not taking any of these, click Next. Select all that apply.    PRENATAL 1 PO   Are you taking any other medications, vitamins, or supplements? Select one.    No   Not selected:    Yes   Have you ever had an  allergic or bad reaction to any medication? Select one.    No   Not selected:    Yes   Do you need a doctor's note? A doctor's note confirms that you received care today and states when you can return to school or work. It does not contain information about your diagnosis or treatment plan. Your provider will make the final decision on whether to give you a doctor's note. Doctor's notes CANNOT be backdated. Select one.    No   Not selected:    Today only (1 day)    Today and tomorrow (2 days)    3 days   Is there anything else you'd like to tell us about your symptoms?    Everyone in my household including my baby girl has cold symptoms. I've had pink eye many times before and this is how it starts before it gets much worse. I want to get it under control before I give it to her. Thanks!   ----------   Medical history   The following information was received from the EMR on October 24, 2022.   Allergies:    No Known Allergies   - Allergy Type:   - Reaction:   - Severity:   - Clinical Status: Active   - Verification Status: Confirmed   Medications:    oxyCODONE-acetaminophen (PERCOCET) tablet 5-325 mg   - Route: Oral   - Start Date: December 22, 2021   - End Date: None   - Status: Active    ferrous sulfate EC tablet 324 mg (65 mg elemental)   - Route: Oral   - Start Date: December 22, 2021   - End Date: None   - Status: Active    PRENATAL 1 PO   - Route: Oral   - Start Date: August 27, 2021   - End Date: None   - Status: Active    FIBER ADULT GUMMIES 2 G PO CHEW   - Route: Oral   - Start Date: August 27, 2021   - End Date: None   - Status: Active     MG PO CAPS   - Route: Oral   - Start Date: December 22, 2021   - End Date: None   - Status: Active    lanolin ointment   - Route: Topical   - Start Date: December 22, 2021   - End Date: None   - Status: Active   Problem list:    Environmental allergies   - Category: Problem List Item   - Health Status:   - Start Date: August 26, 2020   - End Date: None   - Status:  Active    Mixed hyperlipidemia   - Category: Problem List Item   - Health Status:   - Start Date: August 26, 2020   - End Date: None   - Status: Active    Acne   - Category: Problem List Item   - Health Status:   - Start Date: August 27, 2021   - End Date: None   - Status: Active    History of positive PPD   - Category: Problem List Item   - Health Status:   - Start Date: August 27, 2021   - End Date: None   - Status: Active    History of varicella as a child   - Category: Problem List Item   - Health Status:   - Start Date: August 27, 2021   - End Date: None   - Status: Active    Encounter for general adult medical examination w/o abnormal findings   - Category: Problem List Item   - Health Status:   - Start Date: August 27, 2021   - End Date: None   - Status: Active    Encounter for induction of labor   - Category: Problem List Item   - Health Status:   - Start Date: December 19, 2021   - End Date: December 22, 2021   - Status: Resolved

## 2022-10-24 NOTE — EXTERNAL PATIENT INSTRUCTIONS
"   Diagnosis   Bacterial conjunctivitis   My name is Jeana Bailey. I'm a healthcare provider at [facility].   Based on your photos and interview responses, I see you have some common signs of bacterial conjunctivitis, including:    Eye redness    Eye discharge    Eyelid or eyelash crusting    Itchiness    A burning, star, or gritty feeling in your eyes   Medications   Your pharmacy   [facility]/pharmacy #[id number] [address] [facility] [address] [phone]     Prescription   Tobramycin ophthalmic solution (0.3%): Apply 2 drops in affected eye every 4 hours for 7 days for infection. This medication is an antibiotic. Take it exactly as directed. You must complete the 7-day course of medication, even if you feel better after the first few days of treatment.    After reviewing your photos and responses, I'm confident the medication prescribed above will help with your current symptoms. As long as you use the medication as directed, you should start to feel better within 1 to 2 days. If your symptoms continue or get worse, schedule an appointment.   Pregnancy and breastfeeding   This treatment plan is safe to follow while you're breastfeeding.   About your diagnosis   Conjunctivitis, also known as pink eye, is an inflammation of the clear membrane covering the eyeball and the inner eyelid. Bacterial conjunctivitis is an infection caused by some of the same bacteria that cause other common conditions, such as ear and sinus infections. These are the key things to know about bacterial conjunctivitis:    Bacterial conjunctivitis is highly contagious! Without proper hand washing and other preventive measures, it spreads quickly to others.    This condition often starts in one eye and can spread to the other eye within a few days.    The eye discharge tends to be thick and goopy. In fact, it's common to wake up in the morning and feel like your eyes are crusty, gooey, or even \"stuck shut.\"   What to expect   Fortunately, " bacterial conjunctivitis is usually mild and easy to treat. With proper treatment, you should start feeling better within 1 to 2 days. It usually clears completely in 5 days.   When to seek care   Call us at [phone]   with any sudden or unexpected symptoms.    Difficulty opening your eyes.    Moderate to severe eye pain, at rest or with eye movements.    Increase in swelling of the eyes.    Vision changes that don't get better when you wipe your eyes.    Sensitivity to bright lights, such as needing to shade your eyes by wearing a hat or sunglasses, holding up your hand to block out light, or keeping your head down and turned away from lights, lamps, or windows.    Redness in or around your eyes that increases or gets worse. In rare cases, eye drops can make eye irritation worse.    A fever above 100.4F or that lasts for more than 4 days.   Other treatment    Use refrigerated artificial tears and cool compresses to help with eye dryness, itching, and inflammation.    Gently clean around the edges of your eyes with cotton balls or gauze and warm water. This helps remove discharge and crusting.   It's possible to get conjunctivitis again after the initial symptoms have gone away. To avoid re-infection:    Throw away eye or face makeup, gauze, or cotton balls you used while your eyes were infected.    Clean any eyeglasses and cases that were used while infected.    Wash all towels, pillowcases, and linens separately and in hot water.   Prevention    Regularly wash your hands using soap and warm water. An alcohol-based handrub also works.    Wash your hands if you come into contact with an infected person. Wash your hands if you touch items an infected person has used.    Keep hands and fingers out of your eyes.    Don't share items such as pillows, towels, eye drops, makeup, or eyeglasses.    If you begin to wear contact lenses, follow your provider's directions for cleaning, storing, and replacing them.     Consider waiting at least 24 hours after starting treatment before returning to group activities.   Your provider   Your diagnosis was provided by Jeana Bailey, a member of your trusted care team at [facility].   If you have any questions, call us at [phone]  .

## 2023-02-04 ENCOUNTER — APPOINTMENT (OUTPATIENT)
Dept: ULTRASOUND IMAGING | Facility: HOSPITAL | Age: 28
End: 2023-02-04
Payer: COMMERCIAL

## 2023-02-04 ENCOUNTER — HOSPITAL ENCOUNTER (EMERGENCY)
Facility: HOSPITAL | Age: 28
Discharge: HOME | End: 2023-02-04
Attending: EMERGENCY MEDICINE | Admitting: EMERGENCY MEDICINE
Payer: COMMERCIAL

## 2023-02-04 VITALS
DIASTOLIC BLOOD PRESSURE: 78 MMHG | RESPIRATION RATE: 18 BRPM | OXYGEN SATURATION: 99 % | BODY MASS INDEX: 22.74 KG/M2 | TEMPERATURE: 97.8 F | SYSTOLIC BLOOD PRESSURE: 128 MMHG | HEART RATE: 97 BPM | HEIGHT: 65 IN | WEIGHT: 136.47 LBS

## 2023-02-04 DIAGNOSIS — O03.9 COMPLETE ABORTION: Primary | ICD-10-CM

## 2023-02-04 LAB — HCG INTACT+B SERPL-ACNC: 3035 MIU/ML

## 2023-02-04 PROCEDURE — 76817 TRANSVAGINAL US OBSTETRIC: CPT

## 2023-02-04 PROCEDURE — 99284 EMERGENCY DEPT VISIT MOD MDM: CPT

## 2023-02-04 PROCEDURE — 36415 COLL VENOUS BLD VENIPUNCTURE: CPT

## 2023-02-04 PROCEDURE — 88305 TISSUE EXAM BY PATHOLOGIST: CPT | Performed by: EMERGENCY MEDICINE

## 2023-02-04 PROCEDURE — 84702 CHORIONIC GONADOTROPIN TEST: CPT | Performed by: EMERGENCY MEDICINE

## 2023-02-04 PROCEDURE — 99283 EMERGENCY DEPT VISIT LOW MDM: CPT

## 2023-02-04 PROCEDURE — 93976 VASCULAR STUDY: CPT

## 2023-02-04 NOTE — ED PROVIDER NOTES
Subjective   History of Present Illness  27-year-old female presents with complaints of vaginal bleeding.  She states she started with spotting on Thursday started have increased bleeding some mild cramping today.  She is G2, P1.  She is approximately 7 weeks pregnant.  She reports no fever or other complaints.  Review of Systems    No past medical history on file.    No Known Allergies    Past Surgical History:   Procedure Laterality Date   • APPENDECTOMY         •  SECTION N/A 2021    Procedure:  SECTION PRIMARY;  Surgeon: Clair Dawson MD;  Location: [facility] LABOR DELIVERY;  Service: Gynecology;  Laterality: N/A;   • EYE SURGERY  2018    Lasik   • GUM SURGERY     • WISDOM TOOTH EXTRACTION         Family History   Problem Relation Age of Onset   • Hyperlipidemia Father    • Hypertension Father    • Breast cancer Paternal Grandmother    • Hyperlipidemia Paternal Grandmother    • Hypertension Paternal Grandmother    • Cancer Paternal Grandmother         Breast, Ureter   • Alcohol abuse Maternal Grandmother    • Cancer Paternal Grandfather         Lung       Social History     Socioeconomic History   • Marital status:    Tobacco Use   • Smoking status: Never   • Smokeless tobacco: Never   Vaping Use   • Vaping Use: Never used   Substance and Sexual Activity   • Alcohol use: Not Currently     Alcohol/week: 0.0 standard drinks   • Drug use: Never   • Sexual activity: Yes     Partners: Male     Birth control/protection: None     Comment: Currently pregnant- 2nd trimester     Only medication prenatal vitamins      Objective   Physical Exam  27-year-old female awake alert.  Generally well-developed well-nourished.  Abdomen is soft without tenderness or guarding.  Pelvic exam was performed clot and appears to be products of conception in vaginal vault which was removed.  Bimanual exam cervix fingertip open.  Uterus approximately 6 weeks size.  Procedures           ED Course     "  Results for orders placed or performed during the hospital encounter of 23   hCG, Quantitative, Pregnancy    Specimen: Blood   Result Value Ref Range    HCG Quantitative 3,035.00 mIU/mL     US Ob Transvaginal    Result Date: 2023  Impression: Normal uterus and ovaries. There is no evidence of retained products of conception or an intrauterine gestation. Electronically Signed: Emmett Shirley  2023 6:53 PM EST  Workstation ID: [id number]    Medications - No data to display  /79 (BP Location: Left arm, Patient Position: Sitting)   Pulse 101   Temp 97.3 °F (36.3 °C) (Temporal)   Resp 16   Ht 165.1 cm (65\")   Wt 61.9 kg (136 lb 7.4 oz)   LMP 2022   SpO2 100%   BMI 22.71 kg/m²                                        MDM  Chart review: Patient had a  section 2021  Comorbidity: As per past history   Differential: Threatened versus complete AB  My EKG interpretation: EKG not indicated  Lab: Quantitative hCG 3035.  Review of patient's chart shows blood type a positive  My Radiology review and interpretation: Uterus is noted to be empty no evidence of intrauterine gestation or products of conception present.  Adnexa are normal.  Discussion/treatment: Patient's findings were discussed with her and her .  Findings and exam consistent with complete AB.  She is Rh+ RhoGAM not needed patient was discussed with Dr. Yeager.  She will be discharged.  Advised to call office Monday for follow-up appointment.  Return new or worsening symptoms  Patient was evaluated using appropriate PPE      Final diagnoses:   Complete        ED Disposition  ED Disposition     ED Disposition   Discharge    Condition   Stable    Comment   --             No follow-up provider specified.       Medication List      No changes were made to your prescriptions during this visit.          Rai Xiong MD  23 1925    "

## 2023-02-08 LAB
LAB AP CASE REPORT: NORMAL
LAB AP DIAGNOSIS COMMENT: NORMAL
PATH REPORT.FINAL DX SPEC: NORMAL
PATH REPORT.GROSS SPEC: NORMAL

## 2023-05-30 ENCOUNTER — OFFICE VISIT (OUTPATIENT)
Dept: FAMILY MEDICINE CLINIC | Facility: CLINIC | Age: 28
End: 2023-05-30

## 2023-05-30 ENCOUNTER — LAB (OUTPATIENT)
Dept: FAMILY MEDICINE CLINIC | Facility: CLINIC | Age: 28
End: 2023-05-30

## 2023-05-30 VITALS
WEIGHT: 126 LBS | DIASTOLIC BLOOD PRESSURE: 68 MMHG | SYSTOLIC BLOOD PRESSURE: 100 MMHG | OXYGEN SATURATION: 99 % | HEIGHT: 65 IN | TEMPERATURE: 98.7 F | BODY MASS INDEX: 20.99 KG/M2 | HEART RATE: 69 BPM

## 2023-05-30 DIAGNOSIS — Z00.00 ENCOUNTER FOR GENERAL ADULT MEDICAL EXAMINATION W/O ABNORMAL FINDINGS: ICD-10-CM

## 2023-05-30 DIAGNOSIS — Z00.00 ENCOUNTER FOR GENERAL ADULT MEDICAL EXAMINATION W/O ABNORMAL FINDINGS: Primary | ICD-10-CM

## 2023-05-30 PROBLEM — H10.9 BACTERIAL CONJUNCTIVITIS: Status: ACTIVE | Noted: 2023-05-30

## 2023-05-30 PROCEDURE — 99395 PREV VISIT EST AGE 18-39: CPT | Performed by: FAMILY MEDICINE

## 2023-05-30 PROCEDURE — 80053 COMPREHEN METABOLIC PANEL: CPT | Performed by: FAMILY MEDICINE

## 2023-05-30 PROCEDURE — 36415 COLL VENOUS BLD VENIPUNCTURE: CPT

## 2023-05-30 PROCEDURE — 80061 LIPID PANEL: CPT | Performed by: FAMILY MEDICINE

## 2023-05-30 RX ORDER — CETIRIZINE HYDROCHLORIDE 10 MG/1
TABLET ORAL
COMMUNITY
Start: 2023-03-15

## 2023-05-30 RX ORDER — DIPHENHYDRAMINE HYDROCHLORIDE 25 MG/1
TABLET ORAL
COMMUNITY

## 2023-05-30 RX ORDER — NAPROXEN 500 MG/1
500 TABLET ORAL 2 TIMES DAILY WITH MEALS
Qty: 30 TABLET | Refills: 1 | Status: SHIPPED | OUTPATIENT
Start: 2023-05-30

## 2023-05-30 NOTE — PROGRESS NOTES
Subjective   Salma Terrell is a 27 y.o. female.     History of Present Illness       The patient comes in for her physical. She sees GYN. She consents to the use of GREGORIA.    The patient complains of popping and clicking in her left jaw. She states it has always clicked a little bit, but the last couple of months, it has worsened. She states some days her jaw gets stuck and she can not open it all the way. She states it is not painful, but it is just stuck. She states she has not seen her dentist since it has been worse. She states she is due for another cleaning in a few months. She denies any headaches that are out of character. She denies any chest pain. She denies any shortness of breath. She denies any abdominal pain. She states her bowels are moving okay. She states she is urinating okay. She states she is emotionally doing well. She states she is still seeing her GYN. She states she is up to date on her tetanus and whooping cough. She states she has had 2 doses of the COVID-19 vaccine.    The following portions of the patient's history were reviewed and updated as appropriate: allergies, current medications, past family history, past medical history, past social history, past surgical history, and problem list.  Past Medical History:   Diagnosis Date   • Allergic     Seasonal   • History of medical problems 2023    Miscarriage     Past Surgical History:   Procedure Laterality Date   • APPENDECTOMY         •  SECTION N/A 2021    Procedure:  SECTION PRIMARY;  Surgeon: Clair Dawson MD;  Location: [facility] LABOR DELIVERY;  Service: Gynecology;  Laterality: N/A;   • EYE SURGERY  2018    Lasik   • GUM SURGERY     • WISDOM TOOTH EXTRACTION       Family History   Problem Relation Age of Onset   • Hyperlipidemia Father    • Hypertension Father    • Breast cancer Paternal Grandmother    • Hyperlipidemia Paternal Grandmother    • Hypertension Paternal Grandmother    • Cancer Paternal  "Grandmother         Breast, Ureter, Lung   • Alcohol abuse Maternal Grandmother    • Cancer Paternal Grandfather         Lung     Social History     Socioeconomic History   • Marital status:    Tobacco Use   • Smoking status: Never   • Smokeless tobacco: Never   Vaping Use   • Vaping Use: Never used   Substance and Sexual Activity   • Alcohol use: Not Currently   • Drug use: Never   • Sexual activity: Yes     Partners: Male     Birth control/protection: Condom     Comment: Currently pregnant- 2nd trimester         Current Outpatient Medications:   •  Biotin 5 MG capsule, , Disp: , Rfl:   •  cetirizine (zyrTEC) 10 MG tablet, , Disp: , Rfl:   •  Prenatal MV-Min-Fe Fum-FA-DHA (PRENATAL 1 PO), Take 1 tablet by mouth Daily., Disp: , Rfl:   •  naproxen (Naprosyn) 500 MG tablet, Take 1 tablet by mouth 2 (Two) Times a Day With Meals., Disp: 30 tablet, Rfl: 1    Review of Systems   ROS done and noted in HPI  /68 (BP Location: Left arm, Patient Position: Sitting, Cuff Size: Adult)   Pulse 69   Temp 98.7 °F (37.1 °C) (Temporal)   Ht 165.1 cm (65\")   Wt 57.2 kg (126 lb)   LMP 12/16/2022   SpO2 99%   Breastfeeding No   BMI 20.97 kg/m²       Objective   Physical Exam  Vitals and nursing note reviewed.   Constitutional:       Appearance: Normal appearance. She is well-developed and well-groomed.   HENT:      Head: Normocephalic and atraumatic.      Comments: Significant popping and clicking over the left TMJ  No swelling and no significant tenderness     Right Ear: Tympanic membrane, ear canal and external ear normal.      Left Ear: Tympanic membrane, ear canal and external ear normal.      Nose: Nose normal.      Mouth/Throat:      Mouth: Mucous membranes are moist.      Pharynx: Oropharynx is clear.   Eyes:      Extraocular Movements: Extraocular movements intact.      Conjunctiva/sclera: Conjunctivae normal.      Pupils: Pupils are equal, round, and reactive to light.   Neck:      Thyroid: No thyromegaly. "      Vascular: No carotid bruit.   Cardiovascular:      Rate and Rhythm: Normal rate and regular rhythm.      Pulses: Normal pulses.      Heart sounds: Normal heart sounds.   Pulmonary:      Effort: Pulmonary effort is normal.      Breath sounds: Normal breath sounds.   Abdominal:      General: Abdomen is flat. Bowel sounds are normal.      Palpations: Abdomen is soft. There is no hepatomegaly, splenomegaly or mass.      Tenderness: There is no abdominal tenderness.      Hernia: No hernia is present.   Musculoskeletal:      Cervical back: Normal range of motion and neck supple.      Right lower leg: No edema.      Left lower leg: No edema.   Lymphadenopathy:      Cervical: No cervical adenopathy.      Upper Body:      Right upper body: No supraclavicular adenopathy.      Left upper body: No supraclavicular adenopathy.   Skin:     General: Skin is warm and dry.      Findings: No lesion or rash.   Neurological:      General: No focal deficit present.      Mental Status: She is alert.      Motor: Motor function is intact.      Deep Tendon Reflexes: Reflexes are normal and symmetric.   Psychiatric:         Attention and Perception: Attention normal.         Mood and Affect: Mood normal.         Behavior: Behavior is cooperative.           Assessment & Plan   Problems Addressed this Visit        Health Encounters    Encounter for general adult medical examination w/o abnormal findings - Primary    Relevant Orders    Comprehensive Metabolic Panel    Lipid Panel   Diagnoses       Codes Comments    Encounter for general adult medical examination w/o abnormal findings    -  Primary ICD-10-CM: Z00.00  ICD-9-CM: V70.9           1. Encounter for general adult medical exam without abnormal findings  - I have ordered a CMP and a lipid and she is up to date on vaccines. She says she has had two COVID-19 vaccines.    2. TMJ  - I have put her on naproxen 500 mg b.i.d. for 2 weeks and recommended that she do warmcompresses and  discuss this with her dentist. She is not pregnant and she is not breastfeeding.     Transcribed from ambient dictation for Brunilda Cheung MD by Elvia Rowe.  05/30/23   16:37 EDT    Patient or patient representative verbalized consent to the visit recording.  I have personally performed the services described in this document as transcribed by the above individual, and it is both accurate and complete.

## 2023-05-31 LAB
ALBUMIN SERPL-MCNC: 4.8 G/DL (ref 3.5–5.2)
ALBUMIN/GLOB SERPL: 1.7 G/DL
ALP SERPL-CCNC: 44 U/L (ref 39–117)
ALT SERPL W P-5'-P-CCNC: 19 U/L (ref 1–33)
ANION GAP SERPL CALCULATED.3IONS-SCNC: 7.1 MMOL/L (ref 5–15)
AST SERPL-CCNC: 21 U/L (ref 1–32)
BILIRUB SERPL-MCNC: 0.5 MG/DL (ref 0–1.2)
BUN SERPL-MCNC: 10 MG/DL (ref 6–20)
BUN/CREAT SERPL: 14.1 (ref 7–25)
CALCIUM SPEC-SCNC: 10 MG/DL (ref 8.6–10.5)
CHLORIDE SERPL-SCNC: 106 MMOL/L (ref 98–107)
CHOLEST SERPL-MCNC: 171 MG/DL (ref 0–200)
CO2 SERPL-SCNC: 25.9 MMOL/L (ref 22–29)
CREAT SERPL-MCNC: 0.71 MG/DL (ref 0.57–1)
EGFRCR SERPLBLD CKD-EPI 2021: 118.2 ML/MIN/1.73
GLOBULIN UR ELPH-MCNC: 2.9 GM/DL
GLUCOSE SERPL-MCNC: 77 MG/DL (ref 65–99)
HDLC SERPL-MCNC: 44 MG/DL (ref 40–60)
LDLC SERPL CALC-MCNC: 108 MG/DL (ref 0–100)
LDLC/HDLC SERPL: 2.41 {RATIO}
POTASSIUM SERPL-SCNC: 3.8 MMOL/L (ref 3.5–5.2)
PROT SERPL-MCNC: 7.7 G/DL (ref 6–8.5)
SODIUM SERPL-SCNC: 139 MMOL/L (ref 136–145)
TRIGL SERPL-MCNC: 104 MG/DL (ref 0–150)
VLDLC SERPL-MCNC: 19 MG/DL (ref 5–40)

## 2024-02-21 LAB
EXTERNAL HEPATITIS B SURFACE ANTIGEN: NEGATIVE
EXTERNAL HEPATITIS C AB: NEGATIVE
EXTERNAL RUBELLA QUALITATIVE: NORMAL
EXTERNAL SYPHILIS RPR SCREEN: NORMAL
HIV1 P24 AG SERPL QL IA: NEGATIVE

## 2024-03-26 ENCOUNTER — OFFICE VISIT (OUTPATIENT)
Dept: ENDOCRINOLOGY | Facility: CLINIC | Age: 29
End: 2024-03-26
Payer: COMMERCIAL

## 2024-03-26 VITALS
SYSTOLIC BLOOD PRESSURE: 118 MMHG | BODY MASS INDEX: 22.49 KG/M2 | WEIGHT: 135 LBS | OXYGEN SATURATION: 98 % | DIASTOLIC BLOOD PRESSURE: 80 MMHG | HEART RATE: 82 BPM | HEIGHT: 65 IN

## 2024-03-26 DIAGNOSIS — E06.3 HASHIMOTO'S THYROIDITIS: Primary | ICD-10-CM

## 2024-03-26 DIAGNOSIS — Z34.92 SECOND TRIMESTER PREGNANCY: ICD-10-CM

## 2024-03-26 PROCEDURE — 99204 OFFICE O/P NEW MOD 45 MIN: CPT | Performed by: INTERNAL MEDICINE

## 2024-03-26 RX ORDER — LEVOTHYROXINE SODIUM 0.03 MG/1
25 TABLET ORAL DAILY
Qty: 30 TABLET | Refills: 5 | Status: SHIPPED | OUTPATIENT
Start: 2024-03-26

## 2024-03-26 RX ORDER — LEVOTHYROXINE SODIUM 0.03 MG/1
25 TABLET ORAL DAILY
COMMUNITY
Start: 2024-03-15 | End: 2024-03-26 | Stop reason: SDUPTHER

## 2024-03-26 RX ORDER — ASCORBIC ACID 100 MG
1 TABLET,CHEWABLE ORAL DAILY
COMMUNITY

## 2024-03-26 NOTE — PATIENT INSTRUCTIONS
Please,    #Levothyroxine Dosing:    - Continue levothyroxine tablets 25 mcg by mouth daily at 6:00 AM  - Take it every day, on an empty stomach, 30 minutes before eating  - Avoid taking it with iron, calcium, other medications (blocks absorption), wait at least 4 hrs after taking levothyroxine to take these medications  - If you miss a pill, you can take 2 the next day and return to schedule from next day     - Follow up blood work before next visit in 3 months time.    Thank you for your visit today.    If you have any questions or concerns please feel free to reach out of the office.

## 2024-03-26 NOTE — PROGRESS NOTES
-----------------------------------------------------------------  ENDOCRINE CLINIC NOTE  -----------------------------------------------------------------        PATIENT NAME: Salma Terrell  PATIENT : 1995 AGE: 28 y.o.  MRN NUMBER: [id number]  PRIMARY CARE: Brunilda Cheung MD    ==========================================================================    CHIEF COMPLAINT: High TSH during pregnancy  DATE OF SERVICE: 24    ==========================================================================    HPI / SUBJECTIVE    28 y.o. female  is seen in the clinic today for high TSH and patient is currently 14 weeks pregnant.  Denies any previous history of thyroid disorder.  Patient recently had blood work done on 3/11/2024 which showed TSH mildly elevated to 3.84 with free T4 of 1.2 and elevated TPO antibodies.  Patient have a history of miscarriage last year, she was around 7 weeks pregnant at that time.  Denies any family history of thyroid disorder.  Denies any significant difference as compared to her previous pregnancy though reports to have some fatigue.  Reports to have both cold and hot intolerance.    ==========================================================================                                                PAST MEDICAL HISTORY    Past Medical History:   Diagnosis Date    Allergic     Seasonal    History of medical problems 2023    Miscarriage       ==========================================================================    PAST SURGICAL HISTORY    Past Surgical History:   Procedure Laterality Date    APPENDECTOMY      2007     SECTION N/A 2021    Procedure:  SECTION PRIMARY;  Surgeon: Clair Dawson MD;  Location: [facility] LABOR DELIVERY;  Service: Gynecology;  Laterality: N/A;    EYE SURGERY  2018    Lasik    GUM SURGERY      WISDOM TOOTH EXTRACTION          ==========================================================================    FAMILY HISTORY    Family History   Problem Relation Age of Onset    Hyperlipidemia Father     Hypertension Father     Breast cancer Paternal Grandmother     Hyperlipidemia Paternal Grandmother     Hypertension Paternal Grandmother     Cancer Paternal Grandmother         Breast, Ureter, Lung    Alcohol abuse Maternal Grandmother     Cancer Paternal Grandfather         Lung       ==========================================================================    SOCIAL HISTORY    Social History     Socioeconomic History    Marital status:    Tobacco Use    Smoking status: Never    Smokeless tobacco: Never   Vaping Use    Vaping status: Never Used   Substance and Sexual Activity    Alcohol use: Not Currently    Drug use: Never    Sexual activity: Yes     Partners: Male     Birth control/protection: None, Same-sex partner     Comment: Currently 13 weeks pregnant       ==========================================================================    MEDICATIONS      Current Outpatient Medications:     Ascorbic Acid (Vitamin C) 100 MG chewable tablet, Chew 1 tablet Daily., Disp: , Rfl:     levothyroxine (SYNTHROID, LEVOTHROID) 25 MCG tablet, Take 1 tablet by mouth Daily., Disp: , Rfl:     Prenatal MV-Min-Fe Fum-FA-DHA (PRENATAL 1 PO), Take 1 tablet by mouth Daily., Disp: , Rfl:     ==========================================================================    ALLERGIES    No Known Allergies    ==========================================================================    OBJECTIVE    Vitals:    03/26/24 1110   BP: 118/80   Pulse: 82   SpO2: 98%     Body mass index is 22.47 kg/m².     General: Alert, cooperative, no acute distress  Thyroid:  no enlargement/tenderness/palpable nodules  Lungs: Clear to auscultation bilaterally, respirations unlabored  Heart: Regular rate and rhythm, S1 and S2 normal, no murmur, rub or gallop  Abdomen: Soft, NT, ND  "and Bowel sounds Positive  Extremities:  Extremities normal, atraumatic, no cyanosis or edema    ==========================================================================    LAB EVALUATION    Lab Results   Component Value Date    GLUCOSE 77 05/30/2023    BUN 10 05/30/2023    CREATININE 0.71 05/30/2023    EGFRIFNONA 93 08/26/2020    BCR 14.1 05/30/2023    K 3.8 05/30/2023    CO2 25.9 05/30/2023    CALCIUM 10.0 05/30/2023    ALBUMIN 4.8 05/30/2023    LABIL2 1.0 03/29/2019    AST 21 05/30/2023    ALT 19 05/30/2023    CHOL 171 05/30/2023    TRIG 104 05/30/2023    HDL 44 05/30/2023     (H) 05/30/2023     No results found for: \"HGBA1C\"  Lab Results   Component Value Date    CREATININE 0.71 05/30/2023     No results found for: \"TSH\", \"FREET4\", \"THYROIDAB\"    3/11/2024  TSH 3.84  Free T41.2      ==========================================================================    ASSESSMENT AND PLAN    # Hashimoto's thyroiditis but clinically euthyroid  # Second trimester pregnancy  - Patient had TSH and free T4 both within normal limits but given history of miscarriage will agree with continuing levothyroxine 25 mcg that was started by OB/GYN  - Patient to continue levothyroxine and counseled about properly taking levothyroxine rational to use medication fasting in the morning with a glass of water nothing else for next 20 to 40 minutes to which she verbalized understanding  - Plan for repeat thyroid function back again during the start of third trimester and adjust therapy if needed  - Patient most likely will discontinue levothyroxine therapy after completion of pregnancy and will benefit from reevaluation in 2 months after completion of pregnancy  - Thyroid gland and thyroid hormone pathophysiology was discussed with patient in detail to which she verbalized understanding    Thank you for courtesy of consultation.    Return to clinic: 3 months    Entire assessment and plan was discussed and counseled the patient " "in detail to which patient verbalized understanding and agreed with care.  Answered all queries and concerns.    This note was created using voice recognition software and is inherently subject to errors including those of syntax and \"sound-alike\" substitutions which may escape proofreading.  In such instances, original meaning may be extrapolated by contextual derivation.    Note: Portions of this note may have been copied from previous notes but documentation have been reviewed and edited as necessary to support clinical decision making for today's visit.    ==========================================================================    INFORMATION PROVIDED TO PATIENT    Patient Instructions   Please,    #Levothyroxine Dosing:    - Continue levothyroxine tablets 25 mcg by mouth daily at 6:00 AM  - Take it every day, on an empty stomach, 30 minutes before eating  - Avoid taking it with iron, calcium, other medications (blocks absorption), wait at least 4 hrs after taking levothyroxine to take these medications  - If you miss a pill, you can take 2 the next day and return to schedule from next day     - Follow up blood work before next visit in 3 months time.    Thank you for your visit today.    If you have any questions or concerns please feel free to reach out of the office.       ==========================================================================  Favio Porter MD  Department of Endocrine, Diabetes and Metabolism  [facility] [place], [place]  ==========================================================================  "

## 2024-06-05 ENCOUNTER — OFFICE VISIT (OUTPATIENT)
Dept: FAMILY MEDICINE CLINIC | Facility: CLINIC | Age: 29
End: 2024-06-05
Payer: COMMERCIAL

## 2024-06-05 VITALS
OXYGEN SATURATION: 99 % | DIASTOLIC BLOOD PRESSURE: 65 MMHG | SYSTOLIC BLOOD PRESSURE: 99 MMHG | WEIGHT: 136 LBS | HEIGHT: 65 IN | HEART RATE: 79 BPM | BODY MASS INDEX: 22.66 KG/M2 | TEMPERATURE: 97.7 F

## 2024-06-05 DIAGNOSIS — Z00.00 ENCOUNTER FOR GENERAL ADULT MEDICAL EXAMINATION W/O ABNORMAL FINDINGS: Primary | ICD-10-CM

## 2024-06-05 PROBLEM — E06.3 HASHIMOTO'S DISEASE: Status: ACTIVE | Noted: 2024-06-05

## 2024-06-05 PROCEDURE — 99395 PREV VISIT EST AGE 18-39: CPT | Performed by: FAMILY MEDICINE

## 2024-06-05 NOTE — PROGRESS NOTES
Subjective   Salma Terrell is a 28 y.o. female.     History of Present Illness  The patient is here today for her physical. She sees GYN.    The patient reports no current health concerns. She continues to consult with her gynecologist and is currently at 24 weeks gestation. She denies experiencing unusual headaches or chest pain. She experiences shortness of breath, which she attributes to her pregnancy. She denies experiencing abdominal pain. Her bowel movements are regular. Emotionally, she is faring well. She has been diagnosed with Hashimoto's disease and is under the care of Dr. Porter for her thyroid condition. She has a scheduled appointment with him at the end of the month.   She is up to date on her vaccines.       The following portions of the patient's history were reviewed and updated as appropriate: allergies, current medications, past family history, past medical history, past social history, past surgical history, and problem list.  Past Medical History:   Diagnosis Date    Allergic     Seasonal    Hashimoto's disease 2024    History of medical problems 2023    Miscarriage    Hypothyroidism 3/2024    Hashimotos     Past Surgical History:   Procedure Laterality Date    APPENDECTOMY           SECTION N/A 2021    Procedure:  SECTION PRIMARY;  Surgeon: Clair Dawson MD;  Location: [facility] LABOR DELIVERY;  Service: Gynecology;  Laterality: N/A;    EYE SURGERY  2018    Lasik    GUM SURGERY      WISDOM TOOTH EXTRACTION       Family History   Problem Relation Age of Onset    Hyperlipidemia Father     Hypertension Father     Breast cancer Paternal Grandmother     Hyperlipidemia Paternal Grandmother     Hypertension Paternal Grandmother     Cancer Paternal Grandmother         Breast, Ureter, Lung    Alcohol abuse Maternal Grandmother     Cancer Paternal Grandfather         Lung     Social History     Socioeconomic History    Marital status:    Tobacco Use     "Smoking status: Never    Smokeless tobacco: Never   Vaping Use    Vaping status: Never Used   Substance and Sexual Activity    Alcohol use: Not Currently    Drug use: Never    Sexual activity: Yes     Partners: Male     Birth control/protection: None, Same-sex partner     Comment: Currently 23 weeks pregnant         Current Outpatient Medications:     Ascorbic Acid (Vitamin C) 100 MG chewable tablet, Chew 1 tablet Daily., Disp: , Rfl:     levothyroxine (SYNTHROID, LEVOTHROID) 25 MCG tablet, Take 1 tablet by mouth Daily., Disp: 30 tablet, Rfl: 5    Prenatal MV-Min-Fe Fum-FA-DHA (PRENATAL 1 PO), Take 1 tablet by mouth Daily., Disp: , Rfl:     Review of Systems  ROS done and noted in HPI    BP 99/65 (BP Location: Left arm, Patient Position: Sitting, Cuff Size: Adult)   Pulse 79   Temp 97.7 °F (36.5 °C) (Temporal)   Ht 165.1 cm (65\")   Wt 61.7 kg (136 lb)   SpO2 99%   BMI 22.63 kg/m²   BMI is within normal parameters. No other follow-up for BMI required.       Objective   Physical Exam  Vitals and nursing note reviewed.   Constitutional:       Appearance: Normal appearance. She is well-developed, well-groomed and normal weight.   HENT:      Head: Normocephalic and atraumatic.      Right Ear: Tympanic membrane, ear canal and external ear normal.      Left Ear: Tympanic membrane, ear canal and external ear normal.      Nose: Nose normal.      Mouth/Throat:      Mouth: Mucous membranes are moist.      Pharynx: Oropharynx is clear.   Eyes:      Extraocular Movements: Extraocular movements intact.      Conjunctiva/sclera: Conjunctivae normal.      Pupils: Pupils are equal, round, and reactive to light.   Neck:      Thyroid: No thyromegaly.      Vascular: No carotid bruit.   Cardiovascular:      Rate and Rhythm: Normal rate and regular rhythm.      Pulses: Normal pulses.      Heart sounds: Normal heart sounds.   Pulmonary:      Effort: Pulmonary effort is normal.      Breath sounds: Normal breath sounds.   Abdominal:    "   General: Abdomen is flat. Bowel sounds are normal.      Palpations: Abdomen is soft. There is no hepatomegaly, splenomegaly or mass.      Tenderness: There is no abdominal tenderness.      Hernia: No hernia is present.      Comments: gravid   Musculoskeletal:      Cervical back: Normal range of motion and neck supple.      Right lower leg: No edema.      Left lower leg: No edema.   Lymphadenopathy:      Cervical: No cervical adenopathy.      Upper Body:      Right upper body: No supraclavicular adenopathy.      Left upper body: No supraclavicular adenopathy.   Skin:     General: Skin is warm and dry.      Findings: No lesion or rash.   Neurological:      General: No focal deficit present.      Mental Status: She is alert.      Motor: Motor function is intact.      Deep Tendon Reflexes: Reflexes are normal and symmetric.   Psychiatric:         Attention and Perception: Attention normal.         Mood and Affect: Mood normal.         Behavior: Behavior is cooperative.       Physical Exam         Results         Assessment & Plan   Problems Addressed this Visit          Health Encounters    Encounter for general adult medical examination w/o abnormal findings - Primary     Diagnoses         Codes Comments    Encounter for general adult medical examination w/o abnormal findings    -  Primary ICD-10-CM: Z00.00  ICD-9-CM: V70.9           Assessment & Plan  1. Annual physical.  The patient's weight, blood pressure, and vital signs are within normal parameters. The patient was advised to maintain an active lifestyle and ensure adequate hydration during the summer months.            Patient or patient representative verbalized consent for the use of Ambient Listening during the visit with  Brunilda Cheung MD for chart documentation. 6/5/2024  09:30 EDT

## 2024-06-24 ENCOUNTER — LAB (OUTPATIENT)
Dept: LAB | Facility: HOSPITAL | Age: 29
End: 2024-06-24
Payer: COMMERCIAL

## 2024-06-24 DIAGNOSIS — E06.3 HASHIMOTO'S THYROIDITIS: ICD-10-CM

## 2024-06-24 LAB
FREE T4, MATERNAL: 1.09 NG/DL
TSH SERPL DL<=0.05 MIU/L-ACNC: 2.48 UIU/ML (ref 0.27–4.2)

## 2024-06-24 PROCEDURE — 84439 ASSAY OF FREE THYROXINE: CPT

## 2024-06-24 PROCEDURE — 84443 ASSAY THYROID STIM HORMONE: CPT

## 2024-06-24 PROCEDURE — 36415 COLL VENOUS BLD VENIPUNCTURE: CPT

## 2024-06-28 ENCOUNTER — OFFICE VISIT (OUTPATIENT)
Dept: ENDOCRINOLOGY | Facility: CLINIC | Age: 29
End: 2024-06-28
Payer: COMMERCIAL

## 2024-06-28 VITALS
HEART RATE: 72 BPM | BODY MASS INDEX: 23.99 KG/M2 | SYSTOLIC BLOOD PRESSURE: 102 MMHG | DIASTOLIC BLOOD PRESSURE: 60 MMHG | HEIGHT: 65 IN | OXYGEN SATURATION: 97 % | WEIGHT: 144 LBS

## 2024-06-28 DIAGNOSIS — Z34.93 THIRD TRIMESTER PREGNANCY: ICD-10-CM

## 2024-06-28 DIAGNOSIS — E06.3 HASHIMOTO'S THYROIDITIS: Primary | ICD-10-CM

## 2024-06-28 RX ORDER — LEVOTHYROXINE SODIUM 0.03 MG/1
25 TABLET ORAL DAILY
Qty: 90 TABLET | Refills: 1 | Status: SHIPPED | OUTPATIENT
Start: 2024-06-28

## 2024-06-28 NOTE — PROGRESS NOTES
-----------------------------------------------------------------  ENDOCRINE CLINIC NOTE  -----------------------------------------------------------------        PATIENT NAME: Salma Terrell  PATIENT : 1995 AGE: 28 y.o.  MRN NUMBER: [id number]  PRIMARY CARE: Brunilda Cheung MD    ==========================================================================    CHIEF COMPLAINT: Hashimoto's thyroiditis  DATE OF SERVICE: 24    ==========================================================================    HPI / SUBJECTIVE    28 y.o. female  is seen in the clinic today for high TSH and patient is currently 27 weeks pregnant.  Patient is due on Sep 25, 2024.  No previous history of thyroid disorder.  Patient have a history significant for mildly elevated TSH with normal free T4 but elevated TPO levels.  Patient have a history significant for miscarriage last year that was around 7 weeks of pregnancy.  No family history of thyroid disorder.  Taking levothyroxine 25 mcg p.o. daily.  Last blood work on 2024 showed thyroid function within normal limits.    ==========================================================================                                                PAST MEDICAL HISTORY    Past Medical History:   Diagnosis Date    Allergic     Seasonal    Hashimoto's disease 2024    History of medical problems 2023    Miscarriage    Hypothyroidism 3/2024    Hashimotos       ==========================================================================    PAST SURGICAL HISTORY    Past Surgical History:   Procedure Laterality Date    APPENDECTOMY      2007     SECTION N/A 2021    Procedure:  SECTION PRIMARY;  Surgeon: Clair Dawson MD;  Location: [facility] LABOR DELIVERY;  Service: Gynecology;  Laterality: N/A;    EYE SURGERY  2018    Lasik    GUM SURGERY      WISDOM TOOTH EXTRACTION          ==========================================================================    FAMILY HISTORY    Family History   Problem Relation Age of Onset    Hyperlipidemia Father     Hypertension Father     Breast cancer Paternal Grandmother     Hyperlipidemia Paternal Grandmother     Hypertension Paternal Grandmother     Cancer Paternal Grandmother         Breast, Ureter, Lung    Alcohol abuse Maternal Grandmother     Cancer Paternal Grandfather         Lung       ==========================================================================    SOCIAL HISTORY    Social History     Socioeconomic History    Marital status:    Tobacco Use    Smoking status: Never    Smokeless tobacco: Never   Vaping Use    Vaping status: Never Used   Substance and Sexual Activity    Alcohol use: Not Currently    Drug use: Never    Sexual activity: Yes     Partners: Male     Birth control/protection: None, Same-sex partner     Comment: Currently 27 weeks pregnant       ==========================================================================    MEDICATIONS      Current Outpatient Medications:     Ascorbic Acid (Vitamin C) 100 MG chewable tablet, Chew 1 tablet Daily., Disp: , Rfl:     levothyroxine (SYNTHROID, LEVOTHROID) 25 MCG tablet, Take 1 tablet by mouth Daily., Disp: 30 tablet, Rfl: 5    Prenatal MV-Min-Fe Fum-FA-DHA (PRENATAL 1 PO), Take 1 tablet by mouth Daily., Disp: , Rfl:     ==========================================================================    ALLERGIES    No Known Allergies    ==========================================================================    OBJECTIVE    Vitals:    06/28/24 0905   BP: 102/60   Pulse: 72   SpO2: 97%       Body mass index is 23.96 kg/m².     General: Alert, cooperative, no acute distress  Thyroid:  no enlargement/tenderness/palpable nodules  Lungs: Clear to auscultation bilaterally, respirations unlabored  Heart: Regular rate and rhythm, S1 and S2 normal, no murmur, rub or gallop  Abdomen:  "Soft, NT, ND and Bowel sounds Positive  Extremities:  Extremities normal, atraumatic, no cyanosis or edema    ==========================================================================    LAB EVALUATION    Lab Results   Component Value Date    GLUCOSE 77 05/30/2023    BUN 10 05/30/2023    CREATININE 0.71 05/30/2023    EGFRIFNONA 93 08/26/2020    BCR 14.1 05/30/2023    K 3.8 05/30/2023    CO2 25.9 05/30/2023    CALCIUM 10.0 05/30/2023    ALBUMIN 4.8 05/30/2023    LABIL2 1.0 03/29/2019    AST 21 05/30/2023    ALT 19 05/30/2023    CHOL 171 05/30/2023    TRIG 104 05/30/2023    HDL 44 05/30/2023     (H) 05/30/2023     No results found for: \"HGBA1C\"  Lab Results   Component Value Date    CREATININE 0.71 05/30/2023     Lab Results   Component Value Date    TSH 2.480 06/24/2024       3/11/2024  TSH 3.84  Free T41.2      Component      Latest Ref Rng 6/24/2024   TSH Baseline      0.270 - 4.200 uIU/mL 2.480    Free T4, Maternal      ng/dL 1.09          ==========================================================================    ASSESSMENT AND PLAN    # Hashimoto's thyroiditis but clinically euthyroid  # Third trimester pregnancy  - Patient thyroid function continues to be within acceptable limit  - Continue levothyroxine 25 mcg p.o. daily  - Repeat thyroid function around December 2024 with clinical follow-up  - Patient have elevated TPO levels and can require more permanent levothyroxine therapy in the future  - Patient to even continue levothyroxine post partum as well    Thank you for courtesy of consultation.    Return to clinic: 6 months    Entire assessment and plan was discussed and counseled the patient in detail to which patient verbalized understanding and agreed with care.  Answered all queries and concerns.    This note was created using voice recognition software and is inherently subject to errors including those of syntax and \"sound-alike\" substitutions which may escape proofreading.  In such " instances, original meaning may be extrapolated by contextual derivation.    Note: Portions of this note may have been copied from previous notes but documentation have been reviewed and edited as necessary to support clinical decision making for today's visit.    ==========================================================================    INFORMATION PROVIDED TO PATIENT    Patient Instructions   Please,    #Levothyroxine Dosing:    - Continue levothyroxine tablets 25 mcg by mouth daily at 6:00 AM  - Take it every day, on an empty stomach, 30 minutes before eating  - Avoid taking it with iron, calcium, other medications (blocks absorption), wait at least 4 hrs after taking levothyroxine to take these medications  - If you miss a pill, you can take 2 the next day and return to schedule from next day     - Follow up blood work before next visit in 6 months time.    Thank you for your visit today.    If you have any questions or concerns please feel free to reach out of the office.       ==========================================================================  Favio Porter MD  Department of Endocrine, Diabetes and Metabolism  [facility] [place]  ==========================================================================

## 2024-06-28 NOTE — PATIENT INSTRUCTIONS
Please,    #Levothyroxine Dosing:    - Continue levothyroxine tablets 25 mcg by mouth daily at 6:00 AM  - Take it every day, on an empty stomach, 30 minutes before eating  - Avoid taking it with iron, calcium, other medications (blocks absorption), wait at least 4 hrs after taking levothyroxine to take these medications  - If you miss a pill, you can take 2 the next day and return to schedule from next day     - Follow up blood work before next visit in 6 months time.    Thank you for your visit today.    If you have any questions or concerns please feel free to reach out of the office.

## 2024-07-30 LAB — EXTERNAL GROUP B STREP ANTIGEN: POSITIVE

## 2024-09-04 ENCOUNTER — HOSPITAL ENCOUNTER (OUTPATIENT)
Facility: HOSPITAL | Age: 29
Discharge: HOME | End: 2024-09-04
Attending: OBSTETRICS & GYNECOLOGY | Admitting: OBSTETRICS & GYNECOLOGY
Payer: COMMERCIAL

## 2024-09-04 VITALS
WEIGHT: 153.31 LBS | HEART RATE: 78 BPM | HEIGHT: 65 IN | OXYGEN SATURATION: 99 % | TEMPERATURE: 98 F | BODY MASS INDEX: 25.54 KG/M2 | RESPIRATION RATE: 15 BRPM | SYSTOLIC BLOOD PRESSURE: 114 MMHG | DIASTOLIC BLOOD PRESSURE: 81 MMHG

## 2024-09-04 PROCEDURE — 59025 FETAL NON-STRESS TEST: CPT

## 2024-09-04 PROCEDURE — G0463 HOSPITAL OUTPT CLINIC VISIT: HCPCS

## 2024-09-04 NOTE — NURSING NOTE
Obstetrical Non-stress Test Interpretation     Name:  Salma Terrlel  MRN: [id number]    29 y.o. female  at 37w0d    Indication: Fetal Well Being      Baseline: 115  Variability:   Moderate  Accelerations: Present  Decelerations: Absent  Contractions:  Irregular/irritability      Impression:  Reactive, Category 1 Strip      Marianne Joseph RN  2024  12:28 EDT

## 2024-09-26 ENCOUNTER — HOSPITAL ENCOUNTER (INPATIENT)
Facility: HOSPITAL | Age: 29
LOS: 2 days | Discharge: HOME | End: 2024-09-28
Attending: OBSTETRICS & GYNECOLOGY | Admitting: OBSTETRICS & GYNECOLOGY
Payer: COMMERCIAL

## 2024-09-26 ENCOUNTER — ANESTHESIA EVENT (OUTPATIENT)
Dept: LABOR AND DELIVERY | Facility: HOSPITAL | Age: 29
End: 2024-09-26
Payer: COMMERCIAL

## 2024-09-26 ENCOUNTER — ANESTHESIA (OUTPATIENT)
Dept: LABOR AND DELIVERY | Facility: HOSPITAL | Age: 29
End: 2024-09-26
Payer: COMMERCIAL

## 2024-09-26 PROBLEM — Z37.9 NORMAL LABOR: Status: ACTIVE | Noted: 2024-09-26

## 2024-09-26 LAB
ABO GROUP BLD: NORMAL
BLD GP AB SCN SERPL QL: NEGATIVE
DEPRECATED RDW RBC AUTO: 43.2 FL (ref 37–54)
ERYTHROCYTE [DISTWIDTH] IN BLOOD BY AUTOMATED COUNT: 12.5 % (ref 12.3–15.4)
HCT VFR BLD AUTO: 45.8 % (ref 34–46.6)
HGB BLD-MCNC: 15.4 G/DL (ref 12–15.9)
MCH RBC QN AUTO: 31.7 PG (ref 26.6–33)
MCHC RBC AUTO-ENTMCNC: 33.6 G/DL (ref 31.5–35.7)
MCV RBC AUTO: 94.2 FL (ref 79–97)
PLATELET # BLD AUTO: 208 10*3/MM3 (ref 140–450)
PMV BLD AUTO: 11.3 FL (ref 6–12)
RBC # BLD AUTO: 4.86 10*6/MM3 (ref 3.77–5.28)
RH BLD: POSITIVE
T&S EXPIRATION DATE: NORMAL
TREPONEMA PALLIDUM IGG+IGM AB [PRESENCE] IN SERUM OR PLASMA BY IMMUNOASSAY: NORMAL
WBC NRBC COR # BLD AUTO: 13.93 10*3/MM3 (ref 3.4–10.8)

## 2024-09-26 PROCEDURE — 86780 TREPONEMA PALLIDUM: CPT | Performed by: OBSTETRICS & GYNECOLOGY

## 2024-09-26 PROCEDURE — 25010000002 PENICILLIN G POTASSIUM PER 600000 UNITS: Performed by: OBSTETRICS & GYNECOLOGY

## 2024-09-26 PROCEDURE — C1755 CATHETER, INTRASPINAL: HCPCS | Performed by: ANESTHESIOLOGY

## 2024-09-26 PROCEDURE — 86901 BLOOD TYPING SEROLOGIC RH(D): CPT | Performed by: OBSTETRICS & GYNECOLOGY

## 2024-09-26 PROCEDURE — 85027 COMPLETE CBC AUTOMATED: CPT | Performed by: OBSTETRICS & GYNECOLOGY

## 2024-09-26 PROCEDURE — 86900 BLOOD TYPING SEROLOGIC ABO: CPT | Performed by: OBSTETRICS & GYNECOLOGY

## 2024-09-26 PROCEDURE — 25810000003 LACTATED RINGERS PER 1000 ML: Performed by: OBSTETRICS & GYNECOLOGY

## 2024-09-26 PROCEDURE — 86850 RBC ANTIBODY SCREEN: CPT | Performed by: OBSTETRICS & GYNECOLOGY

## 2024-09-26 RX ORDER — LEVOTHYROXINE SODIUM 25 UG/1
25 TABLET ORAL
Status: DISCONTINUED | OUTPATIENT
Start: 2024-09-27 | End: 2024-09-28 | Stop reason: HOSPADM

## 2024-09-26 RX ORDER — SODIUM CHLORIDE, SODIUM LACTATE, POTASSIUM CHLORIDE, CALCIUM CHLORIDE 600; 310; 30; 20 MG/100ML; MG/100ML; MG/100ML; MG/100ML
125 INJECTION, SOLUTION INTRAVENOUS CONTINUOUS
Status: DISCONTINUED | OUTPATIENT
Start: 2024-09-26 | End: 2024-09-26

## 2024-09-26 RX ORDER — OXYTOCIN/0.9 % SODIUM CHLORIDE 30/500 ML
999 PLASTIC BAG, INJECTION (ML) INTRAVENOUS ONCE
Status: DISCONTINUED | OUTPATIENT
Start: 2024-09-26 | End: 2024-09-26 | Stop reason: HOSPADM

## 2024-09-26 RX ORDER — BISACODYL 10 MG
10 SUPPOSITORY, RECTAL RECTAL DAILY PRN
Status: DISCONTINUED | OUTPATIENT
Start: 2024-09-27 | End: 2024-09-28 | Stop reason: HOSPADM

## 2024-09-26 RX ORDER — CARBOPROST TROMETHAMINE 250 UG/ML
250 INJECTION, SOLUTION INTRAMUSCULAR
Status: DISCONTINUED | OUTPATIENT
Start: 2024-09-26 | End: 2024-09-26 | Stop reason: HOSPADM

## 2024-09-26 RX ORDER — ONDANSETRON 4 MG/1
4 TABLET, ORALLY DISINTEGRATING ORAL EVERY 8 HOURS PRN
Status: DISCONTINUED | OUTPATIENT
Start: 2024-09-26 | End: 2024-09-28 | Stop reason: HOSPADM

## 2024-09-26 RX ORDER — OXYTOCIN/0.9 % SODIUM CHLORIDE 30/500 ML
250 PLASTIC BAG, INJECTION (ML) INTRAVENOUS CONTINUOUS
Status: ACTIVE | OUTPATIENT
Start: 2024-09-26 | End: 2024-09-26

## 2024-09-26 RX ORDER — IBUPROFEN 600 MG/1
600 TABLET, FILM COATED ORAL EVERY 6 HOURS PRN
Status: DISCONTINUED | OUTPATIENT
Start: 2024-09-26 | End: 2024-09-28 | Stop reason: HOSPADM

## 2024-09-26 RX ORDER — LIDOCAINE HCL/EPINEPHRINE/PF 2%-1:200K
VIAL (ML) INJECTION
Status: COMPLETED
Start: 2024-09-26 | End: 2024-09-26

## 2024-09-26 RX ORDER — MISOPROSTOL 200 UG/1
800 TABLET ORAL ONCE AS NEEDED
Status: DISCONTINUED | OUTPATIENT
Start: 2024-09-26 | End: 2024-09-26 | Stop reason: HOSPADM

## 2024-09-26 RX ORDER — FENTANYL/BUPIVACAINE/NS/PF 2-1250MCG
PLASTIC BAG, INJECTION (ML) INJECTION
Status: COMPLETED
Start: 2024-09-26 | End: 2024-09-26

## 2024-09-26 RX ORDER — METHYLERGONOVINE MALEATE 0.2 MG/ML
200 INJECTION INTRAVENOUS ONCE AS NEEDED
Status: DISCONTINUED | OUTPATIENT
Start: 2024-09-26 | End: 2024-09-26 | Stop reason: HOSPADM

## 2024-09-26 RX ORDER — LIDOCAINE HCL/EPINEPHRINE/PF 2%-1:200K
VIAL (ML) INJECTION AS NEEDED
Status: DISCONTINUED | OUTPATIENT
Start: 2024-09-26 | End: 2024-09-26 | Stop reason: SURG

## 2024-09-26 RX ORDER — EPHEDRINE SULFATE 5 MG/ML
10 INJECTION INTRAVENOUS
Status: DISCONTINUED | OUTPATIENT
Start: 2024-09-26 | End: 2024-09-26 | Stop reason: HOSPADM

## 2024-09-26 RX ORDER — DOCUSATE SODIUM 100 MG/1
100 CAPSULE, LIQUID FILLED ORAL 2 TIMES DAILY
Status: DISCONTINUED | OUTPATIENT
Start: 2024-09-26 | End: 2024-09-28 | Stop reason: HOSPADM

## 2024-09-26 RX ORDER — HYDROCODONE BITARTRATE AND ACETAMINOPHEN 10; 325 MG/1; MG/1
1 TABLET ORAL EVERY 4 HOURS PRN
Status: DISCONTINUED | OUTPATIENT
Start: 2024-09-26 | End: 2024-09-28 | Stop reason: HOSPADM

## 2024-09-26 RX ORDER — HYDROCODONE BITARTRATE AND ACETAMINOPHEN 5; 325 MG/1; MG/1
1 TABLET ORAL EVERY 4 HOURS PRN
Status: DISCONTINUED | OUTPATIENT
Start: 2024-09-26 | End: 2024-09-28 | Stop reason: HOSPADM

## 2024-09-26 RX ORDER — FENTANYL/BUPIVACAINE/NS/PF 2-1250MCG
PLASTIC BAG, INJECTION (ML) INJECTION CONTINUOUS
Status: DISCONTINUED | OUTPATIENT
Start: 2024-09-26 | End: 2024-09-26

## 2024-09-26 RX ORDER — MAGNESIUM CARB/ALUMINUM HYDROX 105-160MG
30 TABLET,CHEWABLE ORAL ONCE
Status: DISCONTINUED | OUTPATIENT
Start: 2024-09-26 | End: 2024-09-26 | Stop reason: HOSPADM

## 2024-09-26 RX ORDER — OXYTOCIN/0.9 % SODIUM CHLORIDE 30/500 ML
2-20 PLASTIC BAG, INJECTION (ML) INTRAVENOUS
Status: DISCONTINUED | OUTPATIENT
Start: 2024-09-26 | End: 2024-09-26 | Stop reason: HOSPADM

## 2024-09-26 RX ORDER — PRENATAL VIT/IRON FUM/FOLIC AC 27MG-0.8MG
1 TABLET ORAL DAILY
Status: DISCONTINUED | OUTPATIENT
Start: 2024-09-26 | End: 2024-09-28 | Stop reason: HOSPADM

## 2024-09-26 RX ORDER — ACETAMINOPHEN 325 MG/1
650 TABLET ORAL EVERY 4 HOURS PRN
Status: DISCONTINUED | OUTPATIENT
Start: 2024-09-26 | End: 2024-09-26 | Stop reason: HOSPADM

## 2024-09-26 RX ORDER — FENTANYL/BUPIVACAINE/NS/PF 2-1250MCG
PLASTIC BAG, INJECTION (ML) INJECTION CONTINUOUS PRN
Status: DISCONTINUED | OUTPATIENT
Start: 2024-09-26 | End: 2024-09-26 | Stop reason: SURG

## 2024-09-26 RX ORDER — ACETAMINOPHEN 325 MG/1
650 TABLET ORAL EVERY 6 HOURS PRN
Status: DISCONTINUED | OUTPATIENT
Start: 2024-09-26 | End: 2024-09-28 | Stop reason: HOSPADM

## 2024-09-26 RX ORDER — OXYTOCIN/0.9 % SODIUM CHLORIDE 30/500 ML
125 PLASTIC BAG, INJECTION (ML) INTRAVENOUS ONCE AS NEEDED
Status: DISCONTINUED | OUTPATIENT
Start: 2024-09-26 | End: 2024-09-28 | Stop reason: HOSPADM

## 2024-09-26 RX ORDER — HYDROCORTISONE ACETATE PRAMOXINE HCL 2.5; 1 G/100G; G/100G
1 CREAM TOPICAL AS NEEDED
Status: DISCONTINUED | OUTPATIENT
Start: 2024-09-26 | End: 2024-09-28 | Stop reason: HOSPADM

## 2024-09-26 RX ORDER — SODIUM CHLORIDE 0.9 % (FLUSH) 0.9 %
10 SYRINGE (ML) INJECTION AS NEEDED
Status: DISCONTINUED | OUTPATIENT
Start: 2024-09-26 | End: 2024-09-26 | Stop reason: HOSPADM

## 2024-09-26 RX ADMIN — SODIUM CHLORIDE 5 MILLION UNITS: 900 INJECTION INTRAVENOUS at 08:40

## 2024-09-26 RX ADMIN — DOCUSATE SODIUM 100 MG: 100 CAPSULE, LIQUID FILLED ORAL at 20:00

## 2024-09-26 RX ADMIN — IBUPROFEN 600 MG: 600 TABLET, FILM COATED ORAL at 23:00

## 2024-09-26 RX ADMIN — LIDOCAINE HYDROCHLORIDE AND EPINEPHRINE 3 ML: 20; 5 INJECTION, SOLUTION EPIDURAL; INFILTRATION; INTRACAUDAL; PERINEURAL at 09:39

## 2024-09-26 RX ADMIN — Medication 8 ML/HR: at 09:45

## 2024-09-26 RX ADMIN — SODIUM CHLORIDE, POTASSIUM CHLORIDE, SODIUM LACTATE AND CALCIUM CHLORIDE 125 ML/HR: 600; 310; 30; 20 INJECTION, SOLUTION INTRAVENOUS at 08:41

## 2024-09-26 RX ADMIN — Medication 1 G: at 14:45

## 2024-09-26 RX ADMIN — IBUPROFEN 600 MG: 600 TABLET, FILM COATED ORAL at 16:17

## 2024-09-26 RX ADMIN — WITCH HAZEL: 500 SOLUTION RECTAL; TOPICAL at 14:45

## 2024-09-26 NOTE — L&D DELIVERY NOTE
[facility]  Vaginal Delivery Note    Diagnosis     Patient is a 29 y.o. female  currently at 40w1d, who presents with onset of labor.      Delivery     Delivery:  Spontaneous Vaginal Delivery following prior    Date of Delivery:  2024   Anesthesia:      Delivering clinician: Sae Ashraf MD      Delivery narrative: Presents in labor.  She had a prior  section is desires attempted vaginal birth.  Prenatal care was uncomplicated    Normal progression to the active phase of labor.  Epidural for analgesia during the active phase.  Reviewed with reassuring fetal heart rate tracing throughout    Control delivery of a left occiput anterior position over second-degree midline episiotomy.  The infant was bulb suctioned on the perineum.  Thin meconium stained fluid was noted    The infant was handed to waiting parents with good color tone and cry.  Placenta delivered spontaneously intact.  There were no lacerations or extensions of the episiotomy which was repaired with 3 repeat in the standard manner    Infant    Findings: VFI     Apgars: 89 at 1 and 5 minutes.           Placenta, Cord, and Fluid    Placenta delivered  spontaneous  meconium stained   Episiotomy              2nd degree  Lacerations       none   Estimated Blood Loss 300 mL     Complications  none            Sae Ashraf MD  24  12:22 EDT

## 2024-09-26 NOTE — PLAN OF CARE
Goal Outcome Evaluation:  Plan of Care Reviewed With: patient, spouse        Progress: improving         Problem: Adult Inpatient Plan of Care  Goal: Plan of Care Review  Outcome: Progressing  Flowsheets (Taken 9/26/2024 5287)  Progress: improving  Plan of Care Reviewed With:   patient   spouse     Problem: Adjustment to Role Transition (Postpartum Vaginal Delivery)  Goal: Successful Maternal Role Transition  Outcome: Progressing     Problem: Bleeding (Postpartum Vaginal Delivery)  Goal: Hemostasis  Outcome: Progressing     Problem: Pain (Postpartum Vaginal Delivery)  Goal: Acceptable Pain Control  Outcome: Progressing  Intervention: Prevent or Manage Pain  Recent Flowsheet Documentation  Taken 9/26/2024 1617 by Monika Chahal RN  Pain Management Interventions:   see MAR   quiet environment facilitated   position adjusted   pillow support provided   care clustered   breathing exercises     Problem: Urinary Retention (Postpartum Vaginal Delivery)  Goal: Effective Urinary Elimination  Outcome: Progressing     Problem: Breastfeeding  Goal: Effective Breastfeeding  Outcome: Progressing                    Patient is voiding adequately and ambulating frequently around room throughout shift. Patient is breastfeeding frequently and resting between feedings. Patient's pain is currently controlled with ibuprofen. Patient is currently resting in bed, family at bedside, appears comfortable, no distress noted at this time .

## 2024-09-27 LAB
BASOPHILS # BLD AUTO: 0.05 10*3/MM3 (ref 0–0.2)
BASOPHILS NFR BLD AUTO: 0.4 % (ref 0–1.5)
DEPRECATED RDW RBC AUTO: 44.4 FL (ref 37–54)
EOSINOPHIL # BLD AUTO: 0.16 10*3/MM3 (ref 0–0.4)
EOSINOPHIL NFR BLD AUTO: 1.3 % (ref 0.3–6.2)
ERYTHROCYTE [DISTWIDTH] IN BLOOD BY AUTOMATED COUNT: 12.8 % (ref 12.3–15.4)
HCT VFR BLD AUTO: 35.9 % (ref 34–46.6)
HGB BLD-MCNC: 12 G/DL (ref 12–15.9)
IMM GRANULOCYTES # BLD AUTO: 0.05 10*3/MM3 (ref 0–0.05)
IMM GRANULOCYTES NFR BLD AUTO: 0.4 % (ref 0–0.5)
LYMPHOCYTES # BLD AUTO: 1.83 10*3/MM3 (ref 0.7–3.1)
LYMPHOCYTES NFR BLD AUTO: 14.4 % (ref 19.6–45.3)
MCH RBC QN AUTO: 31.7 PG (ref 26.6–33)
MCHC RBC AUTO-ENTMCNC: 33.4 G/DL (ref 31.5–35.7)
MCV RBC AUTO: 95 FL (ref 79–97)
MONOCYTES # BLD AUTO: 1 10*3/MM3 (ref 0.1–0.9)
MONOCYTES NFR BLD AUTO: 7.9 % (ref 5–12)
NEUTROPHILS NFR BLD AUTO: 75.6 % (ref 42.7–76)
NEUTROPHILS NFR BLD AUTO: 9.59 10*3/MM3 (ref 1.7–7)
NRBC BLD AUTO-RTO: 0 /100 WBC (ref 0–0.2)
PLATELET # BLD AUTO: 177 10*3/MM3 (ref 140–450)
PMV BLD AUTO: 11 FL (ref 6–12)
RBC # BLD AUTO: 3.78 10*6/MM3 (ref 3.77–5.28)
WBC NRBC COR # BLD AUTO: 12.68 10*3/MM3 (ref 3.4–10.8)

## 2024-09-27 PROCEDURE — 85025 COMPLETE CBC W/AUTO DIFF WBC: CPT | Performed by: OBSTETRICS & GYNECOLOGY

## 2024-09-27 RX ADMIN — IBUPROFEN 600 MG: 600 TABLET, FILM COATED ORAL at 22:14

## 2024-09-27 RX ADMIN — DOCUSATE SODIUM 100 MG: 100 CAPSULE, LIQUID FILLED ORAL at 22:14

## 2024-09-27 RX ADMIN — DOCUSATE SODIUM 100 MG: 100 CAPSULE, LIQUID FILLED ORAL at 08:42

## 2024-09-27 RX ADMIN — IBUPROFEN 600 MG: 600 TABLET, FILM COATED ORAL at 05:46

## 2024-09-27 RX ADMIN — ACETAMINOPHEN 650 MG: 325 TABLET, FILM COATED ORAL at 09:11

## 2024-09-27 RX ADMIN — IBUPROFEN 600 MG: 600 TABLET, FILM COATED ORAL at 13:23

## 2024-09-27 RX ADMIN — PRENATAL VITAMINS-IRON FUMARATE 27 MG IRON-FOLIC ACID 0.8 MG TABLET 1 TABLET: at 08:42

## 2024-09-27 RX ADMIN — LEVOTHYROXINE SODIUM 25 MCG: 0.03 TABLET ORAL at 05:46

## 2024-09-27 NOTE — L&D DELIVERY NOTE
[facility]  Vaginal Delivery Note    Diagnosis     Patient is a 29 y.o. female  currently at 40w1d, who presents with orders for induction of labor.      Delivery     Delivery:  Spontaneous Vaginal Delivery    Date of Delivery:  2024   Anesthesia: Epidural presents for an induction of labor.  Received Cervidil for cervical ripening.  Was later started on Pitocin and had spontaneous rupture of membranes with clear fluid.   Delivering clinician: Sae Ashraf MD      Delivery narrative:    presents for an induction of labor.  Received Cervidil for cervical ripening.  Was later started on Pitocin and had spontaneous rupture of membranes with clear fluid.  Group B strep positive and was on penicillin for prophylaxis    Normal progression through the active phase of labor with reassuring fetal heart rate tracing under epidural anesthesia.    Deep variable decelerations during the second stage were noted but the infant maintained a stable baseline and good variability    Delivered out of a right occiput anterior position over second-degree midline episiotomy.  The episiotomy was performed in anticipation of a shoulder dystocia as delivery was delayed once the infant was .    A tight nuchal cord was encountered which was clamped divided to allow delivery of the infant.  The infant was transferred to the Isolette for positive pressure ventilation and stimulation.  Apgars were 3 and 9    Placenta delivered spontaneously intact.  The umbilical cord was noted to be unusually short.  There were no extensions of the episiotomy or lacerations of the perineum    Infant    Findings: VMI     Apgars: 3 and 9 at 1 and 5 minutes.           Placenta, Cord, and Fluid    Placenta delivered  spontaneous  clear   Episiotomy              2nd degree  Lacerations       none   Estimated Blood Loss 300 mL     Complications  Tight nuchal cord            Sae Ashraf MD  24  07:06 EDT         [id number]    09/27/24  07:06 EDT

## 2024-09-27 NOTE — PLAN OF CARE
Goal Outcome Evaluation:         Pt has been resting through the night. Pt's pain has been managed with Motrin. Pt hs been breastfeeding baby every 2-3hrs and bonding appropriately. Pt refused baby's bath at 0030 when it was offered. Pt stated she wanted to sleep and asked if we could do it at a later time.

## 2024-09-27 NOTE — PROGRESS NOTES
[facility]  Postpartum Note    Subjective   Postpartum Day 1:  Spontaneous Vaginal Delivery    Patient without complaints. Her pain is moderately controlled with prescribed pain medications. She is ambulating well.  Patient describes her bleeding as thin lochia.    Breastfeeding: infant latching without difficulty.    Objective     Vitals:  Vitals:    09/26/24 2150 09/26/24 2310 09/27/24 0356 09/27/24 0730   BP:  100/64 111/72 107/72   BP Location:  Right arm Right arm Right arm   Patient Position:  Lying Lying Lying   Pulse:  74 69 81   Resp:  17 17 15   Temp:  98.6 °F (37 °C) 97.5 °F (36.4 °C) 98.4 °F (36.9 °C)   TempSrc:  Oral Oral Oral   SpO2:  97% 96% 96%   Weight: 65.1 kg (143 lb 9.6 oz)      Height:           Physical Exam:  General:  Alert and oriented x3. No acute distress.  Abdomen: abdomen is soft without significant tenderness, masses, organomegaly or guarding. Fundus: appropriate, firm, non tender  Incision: N/A  Skin: Warm, Dry  Extremities: Normal,  trace edema. Nontender     Labs:  Results from last 7 days   Lab Units 09/27/24  0550 09/26/24  0826   WBC 10*3/mm3 12.68* 13.93*   HEMOGLOBIN g/dL 12.0 15.4   HEMATOCRIT % 35.9 45.8   PLATELETS 10*3/mm3 177 208            Feeding method: Breastfeeding Status: Yes     Blood Type: RH Positive        Assessment & Plan     Principal Problem:    Normal labor      Salma Terrell is Day 1  post-partum from a  Spontaneous Vaginal Delivery      Plan:  routine, continue present management, and encourage ambulation.       JAZ Lockhart  9/27/2024  09:00 EDT

## 2024-09-27 NOTE — LACTATION NOTE
This note was copied from a baby's chart.  Pt denies hx of breast surgery, no allergy to wool or foods. Medela gel patches provided, instructed on use.    She bf her first baby without difficulty, this baby is nursing well. She has a Thalia pump. Plans to return to work after maternity leave prn. Takes prenatal vitamins.   Observed latching and feeding baby well, baby comes off breast, encouraged to feed baby on demand, do skin to skin often. Will call for help as needed

## 2024-09-28 VITALS
BODY MASS INDEX: 24.39 KG/M2 | SYSTOLIC BLOOD PRESSURE: 111 MMHG | DIASTOLIC BLOOD PRESSURE: 75 MMHG | HEIGHT: 65 IN | RESPIRATION RATE: 14 BRPM | HEART RATE: 65 BPM | WEIGHT: 146.4 LBS | OXYGEN SATURATION: 97 % | TEMPERATURE: 97.8 F

## 2024-09-28 RX ADMIN — WITCH HAZEL: 500 SOLUTION RECTAL; TOPICAL at 05:04

## 2024-09-28 RX ADMIN — DOCUSATE SODIUM 100 MG: 100 CAPSULE, LIQUID FILLED ORAL at 08:03

## 2024-09-28 RX ADMIN — Medication 1 G: at 05:04

## 2024-09-28 RX ADMIN — PRENATAL VITAMINS-IRON FUMARATE 27 MG IRON-FOLIC ACID 0.8 MG TABLET 1 TABLET: at 08:03

## 2024-09-28 RX ADMIN — IBUPROFEN 600 MG: 600 TABLET, FILM COATED ORAL at 05:04

## 2024-09-28 RX ADMIN — LEVOTHYROXINE SODIUM 25 MCG: 0.03 TABLET ORAL at 05:04

## 2024-09-28 NOTE — DISCHARGE SUMMARY
[facility]  Delivery Discharge Summary    Primary OB Clinician: Clair Dawson MD    Admission Diagnosis:  Principal Problem:    Normal labor  Previous CS    Discharge Diagnosis:  S/p     Gestational Age: 40w1d    Date of Delivery: 2024     Delivered By:  Sae Ashraf     Delivery Type: Vaginal, Spontaneous      Intrapartum Course: Uncomplicated delivery.     Postpartum Course:  Patient's postpartum course has been uncomplicated.  Her bleeding is minimal, and her pain is controlled.  She is ambulating and tolerating regular diet.  She is breast-feeding.  She is being discharged home today.  She will follow-up for her postpartum visit.    Of note, the delivery note in the chart on  was inaccurate, noted on the wrong patient.      Physical Exam:    Vitals:   Vitals:    24 1135 24 1534 24 2337 24 0733   BP: 110/76 112/79 102/69 111/75   BP Location: Right arm Right arm Right arm Right arm   Patient Position: Lying Lying Sitting Lying   Pulse: 87 88 80 65   Resp: 16 15 16 14   Temp: 97.8 °F (36.6 °C) 98 °F (36.7 °C) 97.8 °F (36.6 °C) 97.8 °F (36.6 °C)   TempSrc: Oral Oral Oral Oral   SpO2: 96% 97% 97% 97%   Weight:   66.4 kg (146 lb 6.4 oz)    Height:         Temp (24hrs), Av.9 °F (36.6 °C), Min:97.8 °F (36.6 °C), Max:98 °F (36.7 °C)      General Appearance:    Alert, cooperative, in no acute distress   Abdomen:     Soft non-tender, non-distended, no guarding, no rebound         tenderness.   Extremities:   Moves all extremities well, no edema, no cyanosis, no             redness.   Fundus:   Firm, below umbilicus     Feeding method: Breastfeeding Status: Yes    Blood Type: RH Positive      Plan:  Discharge to home.    Follow-up appointment in 6 weeks.

## 2024-09-28 NOTE — PLAN OF CARE
Goal Outcome Evaluation:  Plan of Care Reviewed With: patient        Progress: improving  Outcome Evaluation: Patient breastfeeding well. Bonding with infant. Resting between feedings.

## 2024-09-28 NOTE — PLAN OF CARE
Goal Outcome Evaluation:      Patient to be discharged per MD order. Pain well controlled. Fundus firm and bleeding scant. Breastfeeding well. No further needs at this time.

## 2024-09-28 NOTE — LACTATION NOTE
This note was copied from a baby's chart.  Mother reports that feedings are going well. Breasts are filling. Nipples tender with initial latch only, discussed. Plans for discharge today. Discussed first night at home. Provided with  discharge weight ticket and lactation contact card. Encouraged to call as needed.

## 2024-09-30 NOTE — PAYOR COMM NOTE
"This is discharge notification for Salma Grayson   Reference/Auth # [id number]   Pt discharged routine to home on 9/28/24.    ARSALAN Ghotra, RN, [facility]  Utilization Review Nurse  [facility]  Direct & confidential phone # [phone]  Fax # [phone]      Salma Grayson \"Lashell\" (29 y.o. Female)       Date of Birth   1995    Social Security Number       [address] [place] [address]    Home Phone   [phone]    MRN   [id number]       Islam   Advent    Marital Status                               Admission Date   9/26/24    Admission Type   Elective    Admitting Provider   Clair Dawson MD    Attending Provider       Department, Room/Bed   [facility] MOTHER BABY, M408/1       Discharge Date   9/28/2024    Discharge Disposition   Home or Self Care    Discharge Destination                                 Attending Provider: (none)   Allergies: No Known Allergies    Isolation: None   Infection: None   Code Status: Prior    Ht: 165.1 cm (65\")   Wt: 66.4 kg (146 lb 6.4 oz)    Admission Cmt: None   Principal Problem: Normal labor [O80,Z37.9]                   Active Insurance as of 9/26/2024       Primary Coverage       Payor Plan Insurance Group Employer/Plan Group    [facility] [employer] [facility] [employer] BLUE [facility] PPO [id number]       Payor Plan Address Payor Plan Phone Number Payor Plan Fax Number Effective Dates    [address] [phone]  11/19/2017 - None Entered    [place] [address]         Subscriber Name Subscriber Birth Date Member ID       [id number] 11/20/1987 [id number]                     Emergency Contacts        (Rel.) Home Phone Work Phone Mobile Phone    TRUNG GRAYSON (Spouse) [phone] -- --                 Discharge Summary        lCair Dawson MD at 09/28/24 1320          [facility]  Delivery Discharge Summary    Primary OB Clinician: Clair Dawson MD    Admission " Diagnosis:  Principal Problem:    Normal labor  Previous CS    Discharge Diagnosis:  S/p     Gestational Age: 40w1d    Date of Delivery: 2024     Delivered By:  Sae Ashraf     Delivery Type: Vaginal, Spontaneous      Intrapartum Course: Uncomplicated delivery.     Postpartum Course:  Patient's postpartum course has been uncomplicated.  Her bleeding is minimal, and her pain is controlled.  She is ambulating and tolerating regular diet.  She is breast-feeding.  She is being discharged home today.  She will follow-up for her postpartum visit.    Of note, the delivery note in the chart on  was inaccurate, noted on the wrong patient.      Physical Exam:    Vitals:   Vitals:    24 1135 24 1534 24 2337 24 0733   BP: 110/76 112/79 102/69 111/75   BP Location: Right arm Right arm Right arm Right arm   Patient Position: Lying Lying Sitting Lying   Pulse: 87 88 80 65   Resp: 16 15 16 14   Temp: 97.8 °F (36.6 °C) 98 °F (36.7 °C) 97.8 °F (36.6 °C) 97.8 °F (36.6 °C)   TempSrc: Oral Oral Oral Oral   SpO2: 96% 97% 97% 97%   Weight:   66.4 kg (146 lb 6.4 oz)    Height:         Temp (24hrs), Av.9 °F (36.6 °C), Min:97.8 °F (36.6 °C), Max:98 °F (36.7 °C)      General Appearance:    Alert, cooperative, in no acute distress   Abdomen:     Soft non-tender, non-distended, no guarding, no rebound         tenderness.   Extremities:   Moves all extremities well, no edema, no cyanosis, no             redness.   Fundus:   Firm, below umbilicus     Feeding method: Breastfeeding Status: Yes    Blood Type: RH Positive      Plan:  Discharge to home.    Follow-up appointment in 6 weeks.          Electronically signed by Clair Dawson MD at 24 [phone]

## 2025-01-05 DIAGNOSIS — E06.3 HASHIMOTO'S THYROIDITIS: ICD-10-CM

## 2025-01-06 DIAGNOSIS — E06.3 HASHIMOTO'S THYROIDITIS: ICD-10-CM

## 2025-01-06 RX ORDER — LEVOTHYROXINE SODIUM 25 UG/1
25 TABLET ORAL DAILY
Qty: 90 TABLET | Refills: 1 | Status: SHIPPED | OUTPATIENT
Start: 2025-01-06

## 2025-03-12 ENCOUNTER — LAB (OUTPATIENT)
Dept: LAB | Facility: HOSPITAL | Age: 30
End: 2025-03-12
Payer: COMMERCIAL

## 2025-03-12 DIAGNOSIS — E06.3 HASHIMOTO'S THYROIDITIS: ICD-10-CM

## 2025-03-12 LAB
T4 FREE SERPL-MCNC: 0.85 NG/DL (ref 0.93–1.7)
TSH SERPL DL<=0.05 MIU/L-ACNC: 22.9 UIU/ML (ref 0.27–4.2)

## 2025-03-12 PROCEDURE — 84443 ASSAY THYROID STIM HORMONE: CPT

## 2025-03-12 PROCEDURE — 84439 ASSAY OF FREE THYROXINE: CPT

## 2025-03-12 PROCEDURE — 36415 COLL VENOUS BLD VENIPUNCTURE: CPT

## 2025-03-18 ENCOUNTER — OFFICE VISIT (OUTPATIENT)
Dept: ENDOCRINOLOGY | Facility: CLINIC | Age: 30
End: 2025-03-18
Payer: COMMERCIAL

## 2025-03-18 VITALS
WEIGHT: 124 LBS | DIASTOLIC BLOOD PRESSURE: 68 MMHG | BODY MASS INDEX: 20.66 KG/M2 | OXYGEN SATURATION: 99 % | SYSTOLIC BLOOD PRESSURE: 110 MMHG | HEIGHT: 65 IN | HEART RATE: 72 BPM

## 2025-03-18 DIAGNOSIS — E06.3 HASHIMOTO'S THYROIDITIS: Primary | ICD-10-CM

## 2025-03-18 PROCEDURE — 99214 OFFICE O/P EST MOD 30 MIN: CPT | Performed by: INTERNAL MEDICINE

## 2025-03-18 RX ORDER — GINGER ROOT/GINGER ROOT EXT 262.5 MG
CAPSULE ORAL
COMMUNITY
Start: 2025-03-12

## 2025-03-18 NOTE — PATIENT INSTRUCTIONS
#Levothyroxine Dosing:    - Continue levothyroxine tablets 50 mcg by mouth daily at 6:00 AM  - Take it every day, on an empty stomach, 30 minutes before eating  - Avoid taking it with iron, calcium, other medications (blocks absorption), wait at least 4 hrs after taking levothyroxine to take these medications  - If you miss a pill, you can take 2 the next day and return to schedule from next day     - Blood work in 2 months and in 4 months time.    Clinical follow up in 4 months time.    Thank you for your visit today.    If you have any questions or concerns please feel free to reach out of the office.

## 2025-03-18 NOTE — PROGRESS NOTES
-----------------------------------------------------------------  ENDOCRINE CLINIC NOTE  -----------------------------------------------------------------        PATIENT NAME: Salma Terrell  PATIENT : 1995 AGE: 29 y.o.  MRN NUMBER: [id number]  PRIMARY CARE: Brunilda Cheung MD    ==========================================================================    CHIEF COMPLAINT: Hashimoto's thyroiditis  DATE OF SERVICE: 25    ==========================================================================    HPI / SUBJECTIVE    29 y.o. female  is seen in the clinic today for Hashimoto's thyroiditis.  Patient completed pregnancy on 2024.  No previous history of thyroid disorder but during the pregnancy patient was found to have elevated TSH with normal free T4 but elevated TPO levels.  Patient have a history significant for miscarriage in the past at 7 weeks of pregnancy.  Therefore patient was started on levothyroxine therapy.  Patient continues to be compliant with medication.  Recently had repeat blood work done which showed significantly elevated TSH and low free T4.  Dose adjusted to levothyroxine 50 mcg p.o. daily. (Patient to complete 25 mcg pills by taking 2 of those every day at the same time and then transition to 50 mcg).  Patient still have no cycles.  Denied any trouble nursing.    ==========================================================================                                                PAST MEDICAL HISTORY    Past Medical History:   Diagnosis Date    Allergic     Seasonal    Hashimoto's disease 2024    History of medical problems 2023    Miscarriage    Hypothyroidism 3/2024    Hashimotos       ==========================================================================    PAST SURGICAL HISTORY    Past Surgical History:   Procedure Laterality Date    APPENDECTOMY      2007     SECTION N/A 2021    Procedure:  SECTION PRIMARY;   Surgeon: Clair Dawson MD;  Location: [facility] LABOR DELIVERY;  Service: Gynecology;  Laterality: N/A;    EYE SURGERY  2018    Lasik    GUM SURGERY      WISDOM TOOTH EXTRACTION         ==========================================================================    FAMILY HISTORY    Family History   Problem Relation Age of Onset    Hyperlipidemia Father     Hypertension Father     Breast cancer Paternal Grandmother     Hyperlipidemia Paternal Grandmother     Hypertension Paternal Grandmother     Cancer Paternal Grandmother         Breast, Ureter, Lung    Alcohol abuse Maternal Grandmother     Cancer Paternal Grandfather         Lung       ==========================================================================    SOCIAL HISTORY    Social History     Socioeconomic History    Marital status:    Tobacco Use    Smoking status: Never    Smokeless tobacco: Never   Vaping Use    Vaping status: Never Used   Substance and Sexual Activity    Alcohol use: Not Currently    Drug use: Never    Sexual activity: Yes     Partners: Male     Birth control/protection: None, Partner of same sex       ==========================================================================    MEDICATIONS      Current Outpatient Medications:     Ascorbic Acid (Vitamin C) 100 MG chewable tablet, Chew 1 tablet Daily., Disp: , Rfl:     Calcium Carb-Cholecalciferol (Calcium 600+D) 600-20 MG-MCG tablet, , Disp: , Rfl:     levothyroxine (Synthroid) 50 MCG tablet, Take 1 tablet by mouth Daily for 180 days., Disp: 30 tablet, Rfl: 5    Prenatal MV-Min-Fe Fum-FA-DHA (PRENATAL 1 PO), Take 1 tablet by mouth Daily., Disp: , Rfl:     ==========================================================================    ALLERGIES    No Known Allergies    ==========================================================================    OBJECTIVE    Vitals:    03/18/25 1511   BP: 110/68   Pulse: 72   SpO2: 99%       Body mass index is 20.63 kg/m².     General: Alert,  "cooperative, no acute distress  Thyroid:  no enlargement/tenderness/palpable nodules  Lungs: Clear to auscultation bilaterally, respirations unlabored  Heart: Regular rate and rhythm, S1 and S2 normal, no murmur, rub or gallop  Abdomen: Soft, NT, ND and Bowel sounds Positive  Extremities:  Extremities normal, atraumatic, no cyanosis or edema    ==========================================================================    LAB EVALUATION    Lab Results   Component Value Date    GLUCOSE 77 05/30/2023    BUN 10 05/30/2023    CREATININE 0.71 05/30/2023    EGFRIFNONA 93 08/26/2020    BCR 14.1 05/30/2023    K 3.8 05/30/2023    CO2 25.9 05/30/2023    CALCIUM 10.0 05/30/2023    ALBUMIN 4.8 05/30/2023    AST 21 05/30/2023    ALT 19 05/30/2023    CHOL 171 05/30/2023    TRIG 104 05/30/2023    HDL 44 05/30/2023     (H) 05/30/2023     No results found for: \"HGBA1C\"  Lab Results   Component Value Date    CREATININE 0.71 05/30/2023     Lab Results   Component Value Date    TSH 22.900 (H) 03/12/2025    FREET4 0.85 (L) 03/12/2025       3/11/2024  TSH 3.84  Free T41.2      ==========================================================================    ASSESSMENT AND PLAN    # Hashimoto's thyroiditis  - Patient last had a blood work on 3/12/2025 suggested significantly high TSH with low free T4  - Increase dose of levothyroxine to 50 mcg p.o. daily  - Will plan for repeat blood work in 2 months and in 4 months time with clinical follow-up in 4 months time  - Counseled patient to avoid any pregnancy till patient have normal thyroid function because of complication associated with hypothyroidism during early stages of pregnancy to visualize understanding    Return to clinic: 4 months    Entire assessment and plan was discussed and counseled the patient in detail to which patient verbalized understanding and agreed with care.  Answered all queries and concerns.    This note was created using voice recognition software and is " "inherently subject to errors including those of syntax and \"sound-alike\" substitutions which may escape proofreading.  In such instances, original meaning may be extrapolated by contextual derivation.    Note: Portions of this note may have been copied from previous notes but documentation have been reviewed and edited as necessary to support clinical decision making for today's visit.    ==========================================================================    INFORMATION PROVIDED TO PATIENT    Patient Instructions   #Levothyroxine Dosing:    - Continue levothyroxine tablets 50 mcg by mouth daily at 6:00 AM  - Take it every day, on an empty stomach, 30 minutes before eating  - Avoid taking it with iron, calcium, other medications (blocks absorption), wait at least 4 hrs after taking levothyroxine to take these medications  - If you miss a pill, you can take 2 the next day and return to schedule from next day     - Blood work in 2 months and in 4 months time.    Clinical follow up in 4 months time.    Thank you for your visit today.    If you have any questions or concerns please feel free to reach out of the office.       ==========================================================================  Favio Porter MD  Department of Endocrine, Diabetes and Metabolism  [facility]  ==========================================================================  "

## 2025-06-11 ENCOUNTER — OFFICE VISIT (OUTPATIENT)
Dept: FAMILY MEDICINE CLINIC | Facility: CLINIC | Age: 30
End: 2025-06-11
Payer: COMMERCIAL

## 2025-06-11 VITALS
HEIGHT: 65 IN | WEIGHT: 127 LBS | BODY MASS INDEX: 21.16 KG/M2 | HEART RATE: 83 BPM | OXYGEN SATURATION: 99 % | SYSTOLIC BLOOD PRESSURE: 104 MMHG | DIASTOLIC BLOOD PRESSURE: 58 MMHG

## 2025-06-11 DIAGNOSIS — Z00.00 ENCOUNTER FOR GENERAL ADULT MEDICAL EXAMINATION W/O ABNORMAL FINDINGS: Primary | ICD-10-CM

## 2025-06-11 PROCEDURE — 99395 PREV VISIT EST AGE 18-39: CPT | Performed by: FAMILY MEDICINE

## 2025-06-11 NOTE — PROGRESS NOTES
Subjective   Salma Terrell is a 29 y.o. female.     History of Present Illness  The patient presents for a physical exam.    She reports no current health concerns. She is under the care of a gynecologist and has recently completed her Pap smear. She continues to see an endocrinologist for thyroid management. She reports no unusual headaches, chest pain, or increased shortness of breath. She also reports no abdominal pain and normal bowel movements and urination. Her emotional state is stable. She typically receives her Tdap vaccine during her pregnancies.    She has been experiencing pulsatile tinnitus since the birth of her child, initially suspecting it to be postpartum preeclampsia, but this was not the case. Her blood pressure readings have been within normal limits.    She has developed a sudden intolerance to avocados over the past 1 to 2 years, which she does not attribute to an allergy. Consumption of avocados results in abdominal discomfort.    She has ingrown toenails that are persistent due to dancing.       The following portions of the patient's history were reviewed and updated as appropriate: allergies, current medications, past family history, past medical history, past social history, past surgical history, and problem list.  Past Medical History:   Diagnosis Date    Allergic     Seasonal    Hashimoto's disease 2024    History of medical problems 2023    Miscarriage    Hypothyroidism 3/2024    Hashimotos     Past Surgical History:   Procedure Laterality Date    APPENDECTOMY           SECTION N/A 2021    Procedure:  SECTION PRIMARY;  Surgeon: Clair Dawson MD;  Location: [facility] LABOR DELIVERY;  Service: Gynecology;  Laterality: N/A;    EYE SURGERY  2018    Lasik    GUM SURGERY      WISDOM TOOTH EXTRACTION       Family History   Problem Relation Age of Onset    Hyperlipidemia Father     Hypertension Father     Breast cancer Paternal Grandmother      "Hyperlipidemia Paternal Grandmother     Hypertension Paternal Grandmother     Cancer Paternal Grandmother         Breast, Ureter, Lung    Alcohol abuse Maternal Grandmother     Cancer Paternal Grandfather         Lung     Social History     Socioeconomic History    Marital status:    Tobacco Use    Smoking status: Never    Smokeless tobacco: Never   Vaping Use    Vaping status: Never Used   Substance and Sexual Activity    Alcohol use: Not Currently    Drug use: Never    Sexual activity: Yes     Partners: Male     Birth control/protection: Condom         Current Outpatient Medications:     Ascorbic Acid (Vitamin C) 100 MG chewable tablet, Chew 1 tablet Daily., Disp: , Rfl:     Calcium Carb-Cholecalciferol (Calcium 600+D) 600-20 MG-MCG tablet, , Disp: , Rfl:     levothyroxine (Synthroid) 50 MCG tablet, Take 1 tablet by mouth Daily for 180 days., Disp: 30 tablet, Rfl: 5    Prenatal MV-Min-Fe Fum-FA-DHA (PRENATAL 1 PO), Take 1 tablet by mouth Daily., Disp: , Rfl:     Review of Systems  ROS done and noted in HPI    /58 (BP Location: Left arm, Patient Position: Sitting, Cuff Size: Adult)   Pulse 83   Ht 165.1 cm (65\")   Wt 57.6 kg (127 lb)   SpO2 99%   Breastfeeding Yes   BMI 21.13 kg/m²   BMI is within normal parameters. No other follow-up for BMI required.       Objective   Physical Exam  Vitals and nursing note reviewed.   Constitutional:       Appearance: Normal appearance. She is well-developed, well-groomed and normal weight.   HENT:      Head: Normocephalic and atraumatic.      Right Ear: Tympanic membrane, ear canal and external ear normal.      Left Ear: Tympanic membrane, ear canal and external ear normal.      Nose: Nose normal.      Mouth/Throat:      Mouth: Mucous membranes are moist.      Pharynx: Oropharynx is clear.   Eyes:      Extraocular Movements: Extraocular movements intact.      Conjunctiva/sclera: Conjunctivae normal.      Pupils: Pupils are equal, round, and reactive to light. "   Neck:      Thyroid: No thyromegaly.      Vascular: No carotid bruit.   Cardiovascular:      Rate and Rhythm: Normal rate and regular rhythm.      Pulses: Normal pulses.      Heart sounds: Normal heart sounds.   Pulmonary:      Effort: Pulmonary effort is normal.      Breath sounds: Normal breath sounds.   Abdominal:      General: Abdomen is flat. Bowel sounds are normal.      Palpations: Abdomen is soft. There is no hepatomegaly, splenomegaly or mass.      Tenderness: There is no abdominal tenderness.      Hernia: No hernia is present.   Musculoskeletal:      Cervical back: Normal range of motion and neck supple.      Right lower leg: No edema.      Left lower leg: No edema.   Lymphadenopathy:      Cervical: No cervical adenopathy.      Upper Body:      Right upper body: No supraclavicular adenopathy.      Left upper body: No supraclavicular adenopathy.   Skin:     General: Skin is warm and dry.      Findings: No lesion or rash.   Neurological:      General: No focal deficit present.      Mental Status: She is alert.      Motor: Motor function is intact.      Deep Tendon Reflexes: Reflexes are normal and symmetric.   Psychiatric:         Attention and Perception: Attention normal.         Mood and Affect: Mood normal.         Behavior: Behavior is cooperative.       Physical Exam  Ears: External ear canals and tympanic membranes intact  Respiratory: Clear to auscultation, no wheezing, rales or rhonchi       Results         Assessment & Plan   Problems Addressed this Visit          Health Encounters    Encounter for general adult medical examination w/o abnormal findings - Primary    Relevant Orders    Comprehensive Metabolic Panel    Lipid Panel    Hemoglobin A1c     Diagnoses         Codes Comments      Encounter for general adult medical examination w/o abnormal findings    -  Primary ICD-10-CM: Z00.00  ICD-9-CM: V70.9           Assessment & Plan  1. Health maintenance.  - Immunization status is current,  typically receiving Tdap vaccine with each pregnancy.  - Laboratory tests have been ordered for today.  - No immediate concerns identified during the visit.  - Follow-up scheduled for 1 year or sooner if necessary.                Patient or patient representative verbalized consent for the use of Ambient Listening during the visit with  Brunilda Cheung MD for chart documentation. 6/11/2025  09:21 EDT

## 2025-06-13 ENCOUNTER — LAB (OUTPATIENT)
Dept: FAMILY MEDICINE CLINIC | Facility: CLINIC | Age: 30
End: 2025-06-13
Payer: COMMERCIAL

## 2025-06-13 DIAGNOSIS — Z00.00 ENCOUNTER FOR GENERAL ADULT MEDICAL EXAMINATION W/O ABNORMAL FINDINGS: ICD-10-CM

## 2025-06-13 LAB
ALBUMIN SERPL-MCNC: 4.5 G/DL (ref 3.5–5.2)
ALBUMIN/GLOB SERPL: 1.4 G/DL
ALP SERPL-CCNC: 56 U/L (ref 39–117)
ALT SERPL W P-5'-P-CCNC: 20 U/L (ref 1–33)
ANION GAP SERPL CALCULATED.3IONS-SCNC: 9.4 MMOL/L (ref 5–15)
AST SERPL-CCNC: 22 U/L (ref 1–32)
BILIRUB SERPL-MCNC: 0.8 MG/DL (ref 0–1.2)
BUN SERPL-MCNC: 15 MG/DL (ref 6–20)
BUN/CREAT SERPL: 17.4 (ref 7–25)
CALCIUM SPEC-SCNC: 9.7 MG/DL (ref 8.6–10.5)
CHLORIDE SERPL-SCNC: 103 MMOL/L (ref 98–107)
CHOLEST SERPL-MCNC: 200 MG/DL (ref 0–200)
CO2 SERPL-SCNC: 26.6 MMOL/L (ref 22–29)
CREAT SERPL-MCNC: 0.86 MG/DL (ref 0.57–1)
EGFRCR SERPLBLD CKD-EPI 2021: 92.8 ML/MIN/1.73
GLOBULIN UR ELPH-MCNC: 3.2 GM/DL
GLUCOSE SERPL-MCNC: 74 MG/DL (ref 65–99)
HBA1C MFR BLD: 4.7 % (ref 4.8–5.6)
HDLC SERPL-MCNC: 43 MG/DL (ref 40–60)
LDLC SERPL CALC-MCNC: 144 MG/DL (ref 0–100)
LDLC/HDLC SERPL: 3.31 {RATIO}
POTASSIUM SERPL-SCNC: 4.2 MMOL/L (ref 3.5–5.2)
PROT SERPL-MCNC: 7.7 G/DL (ref 6–8.5)
SODIUM SERPL-SCNC: 139 MMOL/L (ref 136–145)
TRIGL SERPL-MCNC: 73 MG/DL (ref 0–150)
VLDLC SERPL-MCNC: 13 MG/DL (ref 5–40)

## 2025-06-13 PROCEDURE — 80053 COMPREHEN METABOLIC PANEL: CPT | Performed by: FAMILY MEDICINE

## 2025-06-13 PROCEDURE — 80061 LIPID PANEL: CPT | Performed by: FAMILY MEDICINE

## 2025-06-13 PROCEDURE — 83036 HEMOGLOBIN GLYCOSYLATED A1C: CPT | Performed by: FAMILY MEDICINE

## 2025-06-13 PROCEDURE — 36415 COLL VENOUS BLD VENIPUNCTURE: CPT

## 2025-07-14 ENCOUNTER — LAB (OUTPATIENT)
Dept: ENDOCRINOLOGY | Facility: CLINIC | Age: 30
End: 2025-07-14
Payer: COMMERCIAL

## 2025-07-14 DIAGNOSIS — E06.3 HASHIMOTO'S THYROIDITIS: ICD-10-CM

## 2025-07-14 RX ORDER — LEVOTHYROXINE SODIUM 50 UG/1
50 TABLET ORAL DAILY
Qty: 90 TABLET | Refills: 1 | Status: SHIPPED | OUTPATIENT
Start: 2025-07-14

## 2025-07-15 LAB
T4 FREE SERPL-MCNC: 1.26 NG/DL (ref 0.82–1.77)
TSH SERPL DL<=0.005 MIU/L-ACNC: 4.38 UIU/ML (ref 0.45–4.5)

## 2025-07-21 ENCOUNTER — OFFICE VISIT (OUTPATIENT)
Dept: ENDOCRINOLOGY | Facility: CLINIC | Age: 30
End: 2025-07-21
Payer: COMMERCIAL

## 2025-07-21 VITALS
WEIGHT: 126.6 LBS | DIASTOLIC BLOOD PRESSURE: 70 MMHG | HEIGHT: 65 IN | SYSTOLIC BLOOD PRESSURE: 108 MMHG | BODY MASS INDEX: 21.09 KG/M2 | HEART RATE: 76 BPM | OXYGEN SATURATION: 97 %

## 2025-07-21 DIAGNOSIS — E06.3 HASHIMOTO'S THYROIDITIS: Primary | ICD-10-CM

## 2025-07-21 DIAGNOSIS — E06.3 HYPOTHYROIDISM DUE TO HASHIMOTO THYROIDITIS: ICD-10-CM

## 2025-07-21 PROCEDURE — 99214 OFFICE O/P EST MOD 30 MIN: CPT | Performed by: INTERNAL MEDICINE

## 2025-07-21 NOTE — PROGRESS NOTES
-----------------------------------------------------------------  ENDOCRINE CLINIC NOTE  -----------------------------------------------------------------        PATIENT NAME: Salma Terrell  PATIENT : 1995 AGE: 29 y.o.  MRN NUMBER: [id number]  PRIMARY CARE: Brunilda Cheung MD    ==========================================================================    CHIEF COMPLAINT: Hashimoto's thyroiditis  DATE OF SERVICE: 25    ==========================================================================    HPI / SUBJECTIVE    29 y.o. female  is seen in the clinic today for Hashimoto's thyroiditis.  Currently taking levothyroxine 50 mcg p.o. daily.  Tolerating medication without any side effects.  Recently had repeat blood work done which showed thyroid function within acceptable limit.  Patient completed pregnancy on 2024.  Still have no cycles.  Patient continues to be nursing without difficulty.  Compliant with medication.    ==========================================================================                                                PAST MEDICAL HISTORY    Past Medical History:   Diagnosis Date    Allergic     Seasonal    Hashimoto's disease 2024    History of medical problems 2023    Miscarriage    Hyperlipidemia     Hypothyroidism 3/2024    Hashimotos       ==========================================================================    PAST SURGICAL HISTORY    Past Surgical History:   Procedure Laterality Date    APPENDECTOMY           SECTION N/A 2021    Procedure:  SECTION PRIMARY;  Surgeon: Clair Dawson MD;  Location: [facility] LABOR DELIVERY;  Service: Gynecology;  Laterality: N/A;    EYE SURGERY  2018    Lasik    GUM SURGERY      WISDOM TOOTH EXTRACTION         ==========================================================================    FAMILY HISTORY    Family History   Problem Relation Age of Onset     Hyperlipidemia Father     Hypertension Father     Breast cancer Paternal Grandmother     Hyperlipidemia Paternal Grandmother     Hypertension Paternal Grandmother     Cancer Paternal Grandmother         Breast, Ureter, Lung    Alcohol abuse Maternal Grandmother     Cancer Paternal Grandfather         Lung       ==========================================================================    SOCIAL HISTORY    Social History     Socioeconomic History    Marital status:    Tobacco Use    Smoking status: Never    Smokeless tobacco: Never   Vaping Use    Vaping status: Never Used   Substance and Sexual Activity    Alcohol use: Yes     Comment: Socially- few times a year    Drug use: Never    Sexual activity: Yes     Partners: Male     Birth control/protection: Condom       ==========================================================================    MEDICATIONS      Current Outpatient Medications:     Ascorbic Acid (Vitamin C) 100 MG chewable tablet, Chew 1 tablet Daily., Disp: , Rfl:     levothyroxine (SYNTHROID, LEVOTHROID) 50 MCG tablet, TAKE 1 TABLET BY MOUTH EVERY DAY, Disp: 90 tablet, Rfl: 1    Prenatal MV-Min-Fe Fum-FA-DHA (PRENATAL 1 PO), Take 1 tablet by mouth Daily., Disp: , Rfl:     Calcium Carb-Cholecalciferol (Calcium 600+D) 600-20 MG-MCG tablet, , Disp: , Rfl:     ==========================================================================    ALLERGIES    No Known Allergies    ==========================================================================    OBJECTIVE    Vitals:    07/21/25 1028   BP: 108/70   Pulse: 76   SpO2: 97%         Body mass index is 21.07 kg/m².     General: Alert, cooperative, no acute distress  Thyroid:  no enlargement/tenderness/palpable nodules  Lungs: Clear to auscultation bilaterally, respirations unlabored  Heart: Regular rate and rhythm, S1 and S2 normal, no murmur, rub or gallop  Abdomen: Soft, NT, ND and Bowel sounds Positive  Extremities:  Extremities normal, atraumatic, no  "cyanosis or edema    ==========================================================================    LAB EVALUATION    Lab Results   Component Value Date    GLUCOSE 74 06/13/2025    BUN 15.0 06/13/2025    CREATININE 0.86 06/13/2025    EGFRIFNONA 93 08/26/2020    BCR 17.4 06/13/2025    K 4.2 06/13/2025    CO2 26.6 06/13/2025    CALCIUM 9.7 06/13/2025    ALBUMIN 4.5 06/13/2025    AST 22 06/13/2025    ALT 20 06/13/2025    CHOL 200 06/13/2025    TRIG 73 06/13/2025    HDL 43 06/13/2025     (H) 06/13/2025     Lab Results   Component Value Date    HGBA1C 4.70 (L) 06/13/2025     Lab Results   Component Value Date    CREATININE 0.86 06/13/2025     Lab Results   Component Value Date    TSH 4.380 07/14/2025    FREET4 1.26 07/14/2025     3/11/2024  TSH 3.84  Free T41.2      ==========================================================================    ASSESSMENT AND PLAN    # Hashimoto's thyroiditis causing hypothyroidism  - Patient last blood work on 7/14/2025 showed thyroid function within acceptable limit  - Will continue levothyroxine 50 mcg p.o. daily for now  - Will plan for repeat blood work in 6 months  - In the meantime if patient starts planning for the pregnancy counseled patient to start taking 1 extra pill a week to visualize understanding    Return to clinic: 6 months    Entire assessment and plan was discussed and counseled the patient in detail to which patient verbalized understanding and agreed with care.  Answered all queries and concerns.    This note was created using voice recognition software and is inherently subject to errors including those of syntax and \"sound-alike\" substitutions which may escape proofreading.  In such instances, original meaning may be extrapolated by contextual derivation.    Note: Portions of this note may have been copied from previous notes but documentation have been reviewed and edited as necessary to support clinical decision making for today's " visit.    ==========================================================================    INFORMATION PROVIDED TO PATIENT    Patient Instructions   #Levothyroxine Dosing:    - Continue levothyroxine tablets 50 mcg by mouth daily at 6:00 AM  - Take it every day, on an empty stomach, 30 minutes before eating  - Avoid taking it with iron, calcium, other medications (blocks absorption), wait at least 4 hrs after taking levothyroxine to take these medications  - If you miss a pill, you can take 2 the next day and return to schedule from next day     - Blood work in 6 months time.    Clinical follow up in 6 months time.    Thank you for your visit today.    If you have any questions or concerns please feel free to reach out of the office.       ==========================================================================  Favio Porter MD  Department of Endocrine, Diabetes and Metabolism  [place], [place]  ==========================================================================

## (undated) DEVICE — SOL IRRIG H2O 1000ML STRL

## (undated) DEVICE — SOL IRRIG NACL 9PCT 1000ML BTL

## (undated) DEVICE — DRSNG WND BORDR/ADHS NONADHR/GZ LF 4X10IN STRL

## (undated) DEVICE — SPNG LAP PREWSH SFTPK 18X18IN STRL PK/5

## (undated) DEVICE — GLV SURG SENSICARE PI MIC PF SZ6.5 LF STRL

## (undated) DEVICE — SUT MNCRYL 3/0 CT1 36 IN Y944H

## (undated) DEVICE — PK C SECT 50

## (undated) DEVICE — TRY SADDLE BLCK 25G

## (undated) DEVICE — VIOLET BRAIDED (POLYGLACTIN 910), SYNTHETIC ABSORBABLE SUTURE: Brand: COATED VICRYL

## (undated) DEVICE — EXTRA LARGE, DISPOSABLE C-SECTION RETRACTOR: Brand: ALEXIS ® O C-SECTION PROTECTOR-RETRACTOR